# Patient Record
Sex: MALE | Race: WHITE | Employment: OTHER | URBAN - METROPOLITAN AREA
[De-identification: names, ages, dates, MRNs, and addresses within clinical notes are randomized per-mention and may not be internally consistent; named-entity substitution may affect disease eponyms.]

---

## 2019-02-06 ENCOUNTER — APPOINTMENT (OUTPATIENT)
Dept: ULTRASOUND IMAGING | Age: 49
End: 2019-02-06
Payer: COMMERCIAL

## 2019-02-06 ENCOUNTER — APPOINTMENT (OUTPATIENT)
Dept: CT IMAGING | Age: 49
End: 2019-02-06
Payer: COMMERCIAL

## 2019-02-06 ENCOUNTER — HOSPITAL ENCOUNTER (EMERGENCY)
Age: 49
Discharge: HOME | End: 2019-02-06
Attending: EMERGENCY MEDICINE
Payer: COMMERCIAL

## 2019-02-06 VITALS
OXYGEN SATURATION: 94 % | BODY MASS INDEX: 37.94 KG/M2 | HEIGHT: 70 IN | WEIGHT: 265 LBS | SYSTOLIC BLOOD PRESSURE: 160 MMHG | HEART RATE: 91 BPM | DIASTOLIC BLOOD PRESSURE: 87 MMHG | TEMPERATURE: 98.3 F | RESPIRATION RATE: 16 BRPM

## 2019-02-06 DIAGNOSIS — D72.829 LEUKOCYTOSIS, UNSPECIFIED TYPE: ICD-10-CM

## 2019-02-06 DIAGNOSIS — R10.13 ABDOMINAL PAIN, EPIGASTRIC: Primary | ICD-10-CM

## 2019-02-06 DIAGNOSIS — K86.9 LESION OF PANCREAS: ICD-10-CM

## 2019-02-06 DIAGNOSIS — E78.1 HYPERTRIGLYCERIDEMIA: ICD-10-CM

## 2019-02-06 DIAGNOSIS — R10.11 RIGHT UPPER QUADRANT ABDOMINAL PAIN: ICD-10-CM

## 2019-02-06 LAB
ALBUMIN SERPL-MCNC: 3.9 G/DL (ref 3.5–5.2)
ALP BLD-CCNC: 125 U/L (ref 40–129)
ALT SERPL-CCNC: 22 U/L (ref 0–40)
ANION GAP SERPL CALCULATED.3IONS-SCNC: 13 MMOL/L (ref 7–16)
AST SERPL-CCNC: 24 U/L (ref 0–39)
BACTERIA: ABNORMAL /HPF
BASOPHILS ABSOLUTE: 0.1 E9/L (ref 0–0.2)
BASOPHILS RELATIVE PERCENT: 0.6 % (ref 0–2)
BETA-HYDROXYBUTYRATE: 0.23 MMOL/L (ref 0.02–0.27)
BILIRUB SERPL-MCNC: 0.2 MG/DL (ref 0–1.2)
BILIRUBIN DIRECT: <0.2 MG/DL (ref 0–0.3)
BILIRUBIN URINE: NEGATIVE
BILIRUBIN, INDIRECT: NORMAL MG/DL (ref 0–1)
BLOOD, URINE: ABNORMAL
BUN BLDV-MCNC: 19 MG/DL (ref 6–20)
CALCIUM SERPL-MCNC: 10.7 MG/DL (ref 8.6–10.2)
CHLORIDE BLD-SCNC: 102 MMOL/L (ref 98–107)
CLARITY: CLEAR
CO2: 24 MMOL/L (ref 22–29)
COLOR: YELLOW
CREAT SERPL-MCNC: 1 MG/DL (ref 0.7–1.2)
EOSINOPHILS ABSOLUTE: 0.28 E9/L (ref 0.05–0.5)
EOSINOPHILS RELATIVE PERCENT: 1.6 % (ref 0–6)
ETHANOL: <10 MG/DL (ref 0–0.08)
GFR AFRICAN AMERICAN: >60
GFR NON-AFRICAN AMERICAN: >60 ML/MIN/1.73
GLUCOSE BLD-MCNC: 152 MG/DL (ref 74–99)
GLUCOSE URINE: >=1000 MG/DL
HCT VFR BLD CALC: 42.6 % (ref 37–54)
HEMOGLOBIN: 13.9 G/DL (ref 12.5–16.5)
IMMATURE GRANULOCYTES #: 0.18 E9/L
IMMATURE GRANULOCYTES %: 1 % (ref 0–5)
INR BLD: 1
KETONES, URINE: NEGATIVE MG/DL
LACTIC ACID: 3 MMOL/L (ref 0.5–2.2)
LEUKOCYTE ESTERASE, URINE: NEGATIVE
LIPASE: 14 U/L (ref 13–60)
LYMPHOCYTES ABSOLUTE: 1.77 E9/L (ref 1.5–4)
LYMPHOCYTES RELATIVE PERCENT: 9.9 % (ref 20–42)
MCH RBC QN AUTO: 26.2 PG (ref 26–35)
MCHC RBC AUTO-ENTMCNC: 32.6 % (ref 32–34.5)
MCV RBC AUTO: 80.4 FL (ref 80–99.9)
MONOCYTES ABSOLUTE: 1.02 E9/L (ref 0.1–0.95)
MONOCYTES RELATIVE PERCENT: 5.7 % (ref 2–12)
NEUTROPHILS ABSOLUTE: 14.6 E9/L (ref 1.8–7.3)
NEUTROPHILS RELATIVE PERCENT: 81.2 % (ref 43–80)
NITRITE, URINE: NEGATIVE
PDW BLD-RTO: 14.7 FL (ref 11.5–15)
PH UA: 5.5 (ref 5–9)
PH VENOUS: 7.49 (ref 7.3–7.42)
PLATELET # BLD: 298 E9/L (ref 130–450)
PMV BLD AUTO: 10.2 FL (ref 7–12)
POTASSIUM SERPL-SCNC: 4.6 MMOL/L (ref 3.5–5)
PROTEIN UA: 30 MG/DL
PROTHROMBIN TIME: 10.7 SEC (ref 9.3–12.4)
RBC # BLD: 5.3 E12/L (ref 3.8–5.8)
RBC UA: ABNORMAL /HPF (ref 0–2)
SODIUM BLD-SCNC: 139 MMOL/L (ref 132–146)
SPECIFIC GRAVITY UA: 1.02 (ref 1–1.03)
TOTAL PROTEIN: 7.2 G/DL (ref 6.4–8.3)
TRIGL SERPL-MCNC: 229 MG/DL (ref 0–149)
TROPONIN: <0.01 NG/ML (ref 0–0.03)
UROBILINOGEN, URINE: 0.2 E.U./DL
WBC # BLD: 18 E9/L (ref 4.5–11.5)
WBC UA: ABNORMAL /HPF (ref 0–5)

## 2019-02-06 PROCEDURE — 85025 COMPLETE CBC W/AUTO DIFF WBC: CPT

## 2019-02-06 PROCEDURE — 85610 PROTHROMBIN TIME: CPT

## 2019-02-06 PROCEDURE — 74176 CT ABD & PELVIS W/O CONTRAST: CPT

## 2019-02-06 PROCEDURE — 84478 ASSAY OF TRIGLYCERIDES: CPT

## 2019-02-06 PROCEDURE — 36415 COLL VENOUS BLD VENIPUNCTURE: CPT

## 2019-02-06 PROCEDURE — 82800 BLOOD PH: CPT

## 2019-02-06 PROCEDURE — 96374 THER/PROPH/DIAG INJ IV PUSH: CPT

## 2019-02-06 PROCEDURE — 6360000002 HC RX W HCPCS: Performed by: EMERGENCY MEDICINE

## 2019-02-06 PROCEDURE — 83605 ASSAY OF LACTIC ACID: CPT

## 2019-02-06 PROCEDURE — 80048 BASIC METABOLIC PNL TOTAL CA: CPT

## 2019-02-06 PROCEDURE — 82010 KETONE BODYS QUAN: CPT

## 2019-02-06 PROCEDURE — 80076 HEPATIC FUNCTION PANEL: CPT

## 2019-02-06 PROCEDURE — 76705 ECHO EXAM OF ABDOMEN: CPT

## 2019-02-06 PROCEDURE — G0480 DRUG TEST DEF 1-7 CLASSES: HCPCS

## 2019-02-06 PROCEDURE — C9113 INJ PANTOPRAZOLE SODIUM, VIA: HCPCS | Performed by: EMERGENCY MEDICINE

## 2019-02-06 PROCEDURE — 84484 ASSAY OF TROPONIN QUANT: CPT

## 2019-02-06 PROCEDURE — 83690 ASSAY OF LIPASE: CPT

## 2019-02-06 PROCEDURE — 99284 EMERGENCY DEPT VISIT MOD MDM: CPT

## 2019-02-06 PROCEDURE — 2580000003 HC RX 258: Performed by: EMERGENCY MEDICINE

## 2019-02-06 PROCEDURE — 96375 TX/PRO/DX INJ NEW DRUG ADDON: CPT

## 2019-02-06 PROCEDURE — 81001 URINALYSIS AUTO W/SCOPE: CPT

## 2019-02-06 PROCEDURE — 93005 ELECTROCARDIOGRAM TRACING: CPT | Performed by: EMERGENCY MEDICINE

## 2019-02-06 RX ORDER — ONDANSETRON 2 MG/ML
4 INJECTION INTRAMUSCULAR; INTRAVENOUS ONCE
Status: COMPLETED | OUTPATIENT
Start: 2019-02-06 | End: 2019-02-06

## 2019-02-06 RX ORDER — PANTOPRAZOLE SODIUM 40 MG/10ML
40 INJECTION, POWDER, LYOPHILIZED, FOR SOLUTION INTRAVENOUS ONCE
Status: COMPLETED | OUTPATIENT
Start: 2019-02-06 | End: 2019-02-06

## 2019-02-06 RX ORDER — MORPHINE SULFATE 2 MG/ML
10 INJECTION, SOLUTION INTRAMUSCULAR; INTRAVENOUS ONCE
Status: DISCONTINUED | OUTPATIENT
Start: 2019-02-06 | End: 2019-02-06

## 2019-02-06 RX ORDER — HYDROCODONE BITARTRATE AND ACETAMINOPHEN 5; 325 MG/1; MG/1
1 TABLET ORAL EVERY 6 HOURS PRN
Qty: 12 TABLET | Refills: 0 | Status: SHIPPED | OUTPATIENT
Start: 2019-02-06 | End: 2019-02-09

## 2019-02-06 RX ORDER — SODIUM CHLORIDE 0.9 % (FLUSH) 0.9 %
10 SYRINGE (ML) INJECTION PRN
Status: DISCONTINUED | OUTPATIENT
Start: 2019-02-06 | End: 2019-02-06 | Stop reason: HOSPADM

## 2019-02-06 RX ORDER — 0.9 % SODIUM CHLORIDE 0.9 %
1000 INTRAVENOUS SOLUTION INTRAVENOUS ONCE
Status: COMPLETED | OUTPATIENT
Start: 2019-02-06 | End: 2019-02-06

## 2019-02-06 RX ADMIN — HYDROMORPHONE HYDROCHLORIDE 1 MG: 1 INJECTION, SOLUTION INTRAMUSCULAR; INTRAVENOUS; SUBCUTANEOUS at 14:10

## 2019-02-06 RX ADMIN — PANTOPRAZOLE SODIUM 40 MG: 40 INJECTION, POWDER, FOR SOLUTION INTRAVENOUS at 14:13

## 2019-02-06 RX ADMIN — ONDANSETRON 4 MG: 2 INJECTION INTRAMUSCULAR; INTRAVENOUS at 14:09

## 2019-02-06 RX ADMIN — SODIUM CHLORIDE 1000 ML: 9 INJECTION, SOLUTION INTRAVENOUS at 15:30

## 2019-02-06 RX ADMIN — SODIUM CHLORIDE 1000 ML: 9 INJECTION, SOLUTION INTRAVENOUS at 14:09

## 2019-02-06 ASSESSMENT — PAIN DESCRIPTION - PAIN TYPE
TYPE: ACUTE PAIN

## 2019-02-06 ASSESSMENT — ENCOUNTER SYMPTOMS
EYE PAIN: 0
CHEST TIGHTNESS: 0
VOMITING: 0
COLOR CHANGE: 0
SHORTNESS OF BREATH: 0
NAUSEA: 1
BACK PAIN: 0
SORE THROAT: 0
ABDOMINAL PAIN: 1
RHINORRHEA: 0
BLOOD IN STOOL: 0
DIARRHEA: 0
COUGH: 0

## 2019-02-06 ASSESSMENT — PAIN DESCRIPTION - ORIENTATION
ORIENTATION: RIGHT;UPPER

## 2019-02-06 ASSESSMENT — PAIN DESCRIPTION - FREQUENCY
FREQUENCY: CONTINUOUS

## 2019-02-06 ASSESSMENT — PAIN DESCRIPTION - PROGRESSION
CLINICAL_PROGRESSION: GRADUALLY IMPROVING
CLINICAL_PROGRESSION: NOT CHANGED

## 2019-02-06 ASSESSMENT — PAIN SCALES - GENERAL
PAINLEVEL_OUTOF10: 7
PAINLEVEL_OUTOF10: 8
PAINLEVEL_OUTOF10: 9
PAINLEVEL_OUTOF10: 8
PAINLEVEL_OUTOF10: 6

## 2019-02-06 ASSESSMENT — PAIN DESCRIPTION - DESCRIPTORS
DESCRIPTORS: STABBING;SHARP
DESCRIPTORS: STABBING;SHARP
DESCRIPTORS: SHARP;BURNING
DESCRIPTORS: STABBING;SHARP

## 2019-02-06 ASSESSMENT — PAIN DESCRIPTION - LOCATION
LOCATION: ABDOMEN

## 2019-02-06 ASSESSMENT — PAIN DESCRIPTION - ONSET: ONSET: ON-GOING

## 2019-02-08 LAB
EKG ATRIAL RATE: 104 BPM
EKG P AXIS: 12 DEGREES
EKG P-R INTERVAL: 152 MS
EKG Q-T INTERVAL: 326 MS
EKG QRS DURATION: 90 MS
EKG QTC CALCULATION (BAZETT): 428 MS
EKG R AXIS: 22 DEGREES
EKG T AXIS: 44 DEGREES
EKG VENTRICULAR RATE: 104 BPM

## 2021-03-23 ENCOUNTER — IMMUNIZATION (OUTPATIENT)
Dept: PRIMARY CARE CLINIC | Age: 51
End: 2021-03-23
Payer: COMMERCIAL

## 2021-03-23 PROCEDURE — 91300 COVID-19, PFIZER VACCINE 30MCG/0.3ML DOSE: CPT | Performed by: INTERNAL MEDICINE

## 2021-03-23 PROCEDURE — 0001A COVID-19, PFIZER VACCINE 30MCG/0.3ML DOSE: CPT | Performed by: INTERNAL MEDICINE

## 2021-04-20 ENCOUNTER — IMMUNIZATION (OUTPATIENT)
Dept: PRIMARY CARE CLINIC | Age: 51
End: 2021-04-20
Payer: COMMERCIAL

## 2021-04-20 PROCEDURE — 0002A COVID-19, PFIZER VACCINE 30MCG/0.3ML DOSE: CPT | Performed by: INTERNAL MEDICINE

## 2021-04-20 PROCEDURE — 91300 COVID-19, PFIZER VACCINE 30MCG/0.3ML DOSE: CPT | Performed by: INTERNAL MEDICINE

## 2023-05-15 PROBLEM — I10 PRIMARY HYPERTENSION: Status: ACTIVE | Noted: 2023-05-15

## 2023-05-15 PROBLEM — F31.9 BIPOLAR DEPRESSION (MULTI): Status: ACTIVE | Noted: 2023-05-15

## 2023-05-15 PROBLEM — K21.9 GASTROESOPHAGEAL REFLUX DISEASE WITHOUT ESOPHAGITIS: Status: ACTIVE | Noted: 2023-05-15

## 2023-05-15 PROBLEM — K86.3: Status: ACTIVE | Noted: 2023-05-15

## 2023-05-15 PROBLEM — K86.1: Status: ACTIVE | Noted: 2023-05-15

## 2023-05-15 PROBLEM — E10.42 TYPE 1 DIABETES MELLITUS WITH DIABETIC POLYNEUROPATHY (MULTI): Status: ACTIVE | Noted: 2023-05-15

## 2023-05-15 PROBLEM — E66.01 MORBID OBESITY (MULTI): Status: ACTIVE | Noted: 2023-05-15

## 2023-05-15 PROBLEM — F10.21 RECOVERING ALCOHOLIC (MULTI): Status: ACTIVE | Noted: 2023-05-15

## 2023-05-15 NOTE — PROGRESS NOTES
Subjective   Patient ID: Raul Cash is a 53 y.o. male who presents for Med Refill (Basuglar, Novolin and Metoprolol - 6 month refill).    He is here today to follow-up on type 1 diabetes, bipolar depression pseudocyst of the pancreas, GERD, hypertension and morbid obesity.  He works with a psychiatrist who stopped his Seroquel and his weight has been falling off.  In general he feels great other than a little dull pancreatic ache that is chronic.    His blood pressure is too high and we discussed adding amlodipine to the metoprolol    He also is complaining of some erectile dysfunction.         Review of Systems   Cardiovascular:         See HPI   Gastrointestinal:         See HPI   Genitourinary:         As mentioned in the HPI   All other systems reviewed and are negative.      Objective   BP (!) 190/116 (BP Location: Left arm)   Wt 112 kg (246 lb 12.8 oz)   BMI 36.71 kg/m²     Physical Exam  Constitutional:       Appearance: Normal appearance.   HENT:      Head: Normocephalic and atraumatic.   Cardiovascular:      Rate and Rhythm: Normal rate and regular rhythm.   Pulmonary:      Effort: Pulmonary effort is normal.      Breath sounds: Normal breath sounds.   Musculoskeletal:      Cervical back: Normal range of motion and neck supple.   Neurological:      General: No focal deficit present.      Mental Status: He is alert and oriented to person, place, and time.   Psychiatric:         Mood and Affect: Mood normal.         Behavior: Behavior normal.         Thought Content: Thought content normal.         Judgment: Judgment normal.         Assessment/Plan   Problem List Items Addressed This Visit       Type 1 diabetes mellitus with diabetic polyneuropathy (CMS/HCC) - Primary     Working with endocrinology         Relevant Medications    Basaglar KwikPen U-100 Insulin 100 unit/mL (3 mL) pen    NovoLIN N FlexPen 100 unit/mL (3 mL) injection    Other Relevant Orders    CBC    Comprehensive Metabolic Panel     Hemoglobin A1C    Lipid Panel    Bipolar depression (CMS/HCC)     This has been stable         Recovering alcoholic (CMS/[facility])     He is in remission         Pseudocyst of pancreas due to chronic pancreatitis (CMS/[facility])     Followed by GI         Gastroesophageal reflux disease without esophagitis    Primary hypertension    Relevant Medications    metoprolol tartrate (Lopressor) 100 mg tablet    amLODIPine (Norvasc) 5 mg tablet    Morbid obesity (CMS/[facility])     Working with endocrinology          Other Visit Diagnoses       Vasculogenic erectile dysfunction, unspecified vasculogenic erectile dysfunction type        Relevant Medications    sildenafil (Viagra) 100 mg tablet    Other Relevant Orders    Prostate Specific Antigen

## 2023-05-16 ENCOUNTER — OFFICE VISIT (OUTPATIENT)
Dept: PRIMARY CARE | Facility: CLINIC | Age: 53
End: 2023-05-16
Payer: MEDICARE

## 2023-05-16 ENCOUNTER — LAB (OUTPATIENT)
Dept: LAB | Facility: LAB | Age: 53
End: 2023-05-16
Payer: MEDICARE

## 2023-05-16 VITALS
DIASTOLIC BLOOD PRESSURE: 116 MMHG | SYSTOLIC BLOOD PRESSURE: 190 MMHG | WEIGHT: 246.8 LBS | BODY MASS INDEX: 36.71 KG/M2

## 2023-05-16 DIAGNOSIS — E10.42 TYPE 1 DIABETES MELLITUS WITH DIABETIC POLYNEUROPATHY (MULTI): ICD-10-CM

## 2023-05-16 DIAGNOSIS — N52.9 VASCULOGENIC ERECTILE DYSFUNCTION, UNSPECIFIED VASCULOGENIC ERECTILE DYSFUNCTION TYPE: ICD-10-CM

## 2023-05-16 DIAGNOSIS — F10.21 RECOVERING ALCOHOLIC (MULTI): ICD-10-CM

## 2023-05-16 DIAGNOSIS — K86.3: ICD-10-CM

## 2023-05-16 DIAGNOSIS — E66.01 MORBID OBESITY (MULTI): ICD-10-CM

## 2023-05-16 DIAGNOSIS — F31.9 BIPOLAR DEPRESSION (MULTI): ICD-10-CM

## 2023-05-16 DIAGNOSIS — K86.1: ICD-10-CM

## 2023-05-16 DIAGNOSIS — I10 PRIMARY HYPERTENSION: ICD-10-CM

## 2023-05-16 DIAGNOSIS — K21.9 GASTROESOPHAGEAL REFLUX DISEASE WITHOUT ESOPHAGITIS: ICD-10-CM

## 2023-05-16 DIAGNOSIS — E10.42 TYPE 1 DIABETES MELLITUS WITH DIABETIC POLYNEUROPATHY (MULTI): Primary | ICD-10-CM

## 2023-05-16 LAB
ALANINE AMINOTRANSFERASE (SGPT) (U/L) IN SER/PLAS: 12 U/L (ref 10–52)
ALBUMIN (G/DL) IN SER/PLAS: 4.4 G/DL (ref 3.4–5)
ALKALINE PHOSPHATASE (U/L) IN SER/PLAS: 109 U/L (ref 33–120)
ANION GAP IN SER/PLAS: 16 MMOL/L (ref 10–20)
ASPARTATE AMINOTRANSFERASE (SGOT) (U/L) IN SER/PLAS: 13 U/L (ref 9–39)
BILIRUBIN TOTAL (MG/DL) IN SER/PLAS: 0.7 MG/DL (ref 0–1.2)
CALCIUM (MG/DL) IN SER/PLAS: 9.9 MG/DL (ref 8.6–10.3)
CARBON DIOXIDE, TOTAL (MMOL/L) IN SER/PLAS: 24 MMOL/L (ref 21–32)
CHLORIDE (MMOL/L) IN SER/PLAS: 101 MMOL/L (ref 98–107)
CHOLESTEROL (MG/DL) IN SER/PLAS: 201 MG/DL (ref 0–199)
CHOLESTEROL IN HDL (MG/DL) IN SER/PLAS: 50.6 MG/DL
CHOLESTEROL/HDL RATIO: 4
CREATININE (MG/DL) IN SER/PLAS: 1.14 MG/DL (ref 0.5–1.3)
ERYTHROCYTE DISTRIBUTION WIDTH (RATIO) BY AUTOMATED COUNT: 13.8 % (ref 11.5–14.5)
ERYTHROCYTE MEAN CORPUSCULAR HEMOGLOBIN CONCENTRATION (G/DL) BY AUTOMATED: 33.5 G/DL (ref 32–36)
ERYTHROCYTE MEAN CORPUSCULAR VOLUME (FL) BY AUTOMATED COUNT: 81 FL (ref 80–100)
ERYTHROCYTES (10*6/UL) IN BLOOD BY AUTOMATED COUNT: 6.33 X10E12/L (ref 4.5–5.9)
ESTIMATED AVERAGE GLUCOSE FOR HBA1C: 269 MG/DL
GFR MALE: 77 ML/MIN/1.73M2
GLUCOSE (MG/DL) IN SER/PLAS: 230 MG/DL (ref 74–99)
HEMATOCRIT (%) IN BLOOD BY AUTOMATED COUNT: 51 % (ref 41–52)
HEMOGLOBIN (G/DL) IN BLOOD: 17.1 G/DL (ref 13.5–17.5)
HEMOGLOBIN A1C/HEMOGLOBIN TOTAL IN BLOOD: 11 %
LDL: 95 MG/DL (ref 0–99)
LEUKOCYTES (10*3/UL) IN BLOOD BY AUTOMATED COUNT: 11.3 X10E9/L (ref 4.4–11.3)
NON HDL CHOLESTEROL: 150 MG/DL
PLATELETS (10*3/UL) IN BLOOD AUTOMATED COUNT: 347 X10E9/L (ref 150–450)
POTASSIUM (MMOL/L) IN SER/PLAS: 4.7 MMOL/L (ref 3.5–5.3)
PROSTATE SPECIFIC AG (NG/ML) IN SER/PLAS: 2.93 NG/ML (ref 0–4)
PROTEIN TOTAL: 7.2 G/DL (ref 6.4–8.2)
SODIUM (MMOL/L) IN SER/PLAS: 136 MMOL/L (ref 136–145)
TRIGLYCERIDE (MG/DL) IN SER/PLAS: 275 MG/DL (ref 0–149)
UREA NITROGEN (MG/DL) IN SER/PLAS: 27 MG/DL (ref 6–23)
VLDL: 55 MG/DL (ref 0–40)

## 2023-05-16 PROCEDURE — 3080F DIAST BP >= 90 MM HG: CPT | Performed by: FAMILY MEDICINE

## 2023-05-16 PROCEDURE — 3077F SYST BP >= 140 MM HG: CPT | Performed by: FAMILY MEDICINE

## 2023-05-16 PROCEDURE — 85027 COMPLETE CBC AUTOMATED: CPT

## 2023-05-16 PROCEDURE — 80061 LIPID PANEL: CPT

## 2023-05-16 PROCEDURE — 99214 OFFICE O/P EST MOD 30 MIN: CPT | Performed by: FAMILY MEDICINE

## 2023-05-16 PROCEDURE — 83036 HEMOGLOBIN GLYCOSYLATED A1C: CPT

## 2023-05-16 PROCEDURE — 36415 COLL VENOUS BLD VENIPUNCTURE: CPT

## 2023-05-16 PROCEDURE — 84153 ASSAY OF PSA TOTAL: CPT

## 2023-05-16 PROCEDURE — 80053 COMPREHEN METABOLIC PANEL: CPT

## 2023-05-16 RX ORDER — DEXTROAMPHETAMINE SACCHARATE, AMPHETAMINE ASPARTATE, DEXTROAMPHETAMINE SULFATE AND AMPHETAMINE SULFATE 7.5; 7.5; 7.5; 7.5 MG/1; MG/1; MG/1; MG/1
TABLET ORAL
COMMUNITY
Start: 2023-04-26

## 2023-05-16 RX ORDER — AMLODIPINE BESYLATE 5 MG/1
5 TABLET ORAL DAILY
Qty: 30 TABLET | Refills: 5 | Status: SHIPPED | OUTPATIENT
Start: 2023-05-16 | End: 2023-08-16 | Stop reason: SDUPTHER

## 2023-05-16 RX ORDER — HUMAN INSULIN 100 [IU]/ML
18 INJECTION, SUSPENSION SUBCUTANEOUS NIGHTLY
Qty: 16.2 ML | Refills: 3 | Status: SHIPPED | OUTPATIENT
Start: 2023-05-16 | End: 2023-08-16 | Stop reason: SDUPTHER

## 2023-05-16 RX ORDER — ALPRAZOLAM 1 MG/1
1 TABLET ORAL
COMMUNITY

## 2023-05-16 RX ORDER — INSULIN GLARGINE 100 [IU]/ML
100 INJECTION, SOLUTION SUBCUTANEOUS EVERY MORNING
Qty: 90 ML | Refills: 0 | Status: SHIPPED | OUTPATIENT
Start: 2023-05-16 | End: 2023-08-16 | Stop reason: SDUPTHER

## 2023-05-16 RX ORDER — HUMAN INSULIN 100 [IU]/ML
INJECTION, SUSPENSION SUBCUTANEOUS
COMMUNITY
Start: 2023-04-26 | End: 2023-05-16 | Stop reason: SDUPTHER

## 2023-05-16 RX ORDER — ZOLPIDEM TARTRATE 10 MG/1
1 TABLET ORAL NIGHTLY PRN
COMMUNITY
Start: 2020-06-15

## 2023-05-16 RX ORDER — DIVALPROEX SODIUM 250 MG/1
1 TABLET, FILM COATED, EXTENDED RELEASE ORAL DAILY
COMMUNITY
Start: 2021-06-25

## 2023-05-16 RX ORDER — SILDENAFIL 100 MG/1
100 TABLET, FILM COATED ORAL DAILY PRN
Qty: 12 TABLET | Refills: 3 | Status: SHIPPED | OUTPATIENT
Start: 2023-05-16 | End: 2024-05-15

## 2023-05-16 RX ORDER — METOPROLOL TARTRATE 100 MG/1
1 TABLET ORAL 2 TIMES DAILY
COMMUNITY
Start: 2015-06-03 | End: 2023-05-16 | Stop reason: SDUPTHER

## 2023-05-16 RX ORDER — RISPERIDONE 2 MG/1
TABLET ORAL
COMMUNITY
Start: 2023-04-26

## 2023-05-16 RX ORDER — INSULIN GLARGINE 100 [IU]/ML
INJECTION, SOLUTION SUBCUTANEOUS
COMMUNITY
Start: 2018-04-24 | End: 2023-05-16 | Stop reason: SDUPTHER

## 2023-05-16 RX ORDER — METOPROLOL TARTRATE 100 MG/1
100 TABLET ORAL 2 TIMES DAILY
Qty: 180 TABLET | Refills: 3 | Status: SHIPPED | OUTPATIENT
Start: 2023-05-16 | End: 2023-08-16 | Stop reason: SDUPTHER

## 2023-05-16 RX ORDER — BLOOD SUGAR DIAGNOSTIC
STRIP MISCELLANEOUS 4 TIMES DAILY
COMMUNITY
Start: 2019-03-07

## 2023-05-16 RX ORDER — BLOOD SUGAR DIAGNOSTIC
STRIP MISCELLANEOUS 4 TIMES DAILY
COMMUNITY
Start: 2014-09-09

## 2023-05-16 ASSESSMENT — ENCOUNTER SYMPTOMS: ROS GI COMMENTS: SEE HPI

## 2023-05-16 ASSESSMENT — PATIENT HEALTH QUESTIONNAIRE - PHQ9: 2. FEELING DOWN, DEPRESSED OR HOPELESS: NOT AT ALL

## 2023-05-16 NOTE — PATIENT INSTRUCTIONS
I sent in a prescription for Viagra as well as amlodipine for you and I will follow-up with you in 3 months.  Today we will update your major chemistry.

## 2023-08-16 ENCOUNTER — TELEPHONE (OUTPATIENT)
Dept: PRIMARY CARE | Facility: CLINIC | Age: 53
End: 2023-08-16

## 2023-08-16 ENCOUNTER — LAB (OUTPATIENT)
Dept: LAB | Facility: LAB | Age: 53
End: 2023-08-16
Payer: MEDICARE

## 2023-08-16 ENCOUNTER — OFFICE VISIT (OUTPATIENT)
Dept: PRIMARY CARE | Facility: CLINIC | Age: 53
End: 2023-08-16
Payer: MEDICARE

## 2023-08-16 VITALS
DIASTOLIC BLOOD PRESSURE: 100 MMHG | HEART RATE: 71 BPM | WEIGHT: 248 LBS | BODY MASS INDEX: 36.89 KG/M2 | OXYGEN SATURATION: 99 % | TEMPERATURE: 97.4 F | SYSTOLIC BLOOD PRESSURE: 160 MMHG

## 2023-08-16 DIAGNOSIS — E10.42 TYPE 1 DIABETES MELLITUS WITH DIABETIC POLYNEUROPATHY (MULTI): ICD-10-CM

## 2023-08-16 DIAGNOSIS — I10 PRIMARY HYPERTENSION: Primary | ICD-10-CM

## 2023-08-16 LAB
ALANINE AMINOTRANSFERASE (SGPT) (U/L) IN SER/PLAS: 14 U/L (ref 10–52)
ALBUMIN (G/DL) IN SER/PLAS: 4.2 G/DL (ref 3.4–5)
ALKALINE PHOSPHATASE (U/L) IN SER/PLAS: 108 U/L (ref 33–120)
ANION GAP IN SER/PLAS: 15 MMOL/L (ref 10–20)
ASPARTATE AMINOTRANSFERASE (SGOT) (U/L) IN SER/PLAS: 14 U/L (ref 9–39)
BILIRUBIN TOTAL (MG/DL) IN SER/PLAS: 0.7 MG/DL (ref 0–1.2)
CALCIUM (MG/DL) IN SER/PLAS: 9.5 MG/DL (ref 8.6–10.3)
CARBON DIOXIDE, TOTAL (MMOL/L) IN SER/PLAS: 21 MMOL/L (ref 21–32)
CHLORIDE (MMOL/L) IN SER/PLAS: 103 MMOL/L (ref 98–107)
CREATININE (MG/DL) IN SER/PLAS: 1.08 MG/DL (ref 0.5–1.3)
ESTIMATED AVERAGE GLUCOSE FOR HBA1C: 286 MG/DL
GFR MALE: 82 ML/MIN/1.73M2
GLUCOSE (MG/DL) IN SER/PLAS: 306 MG/DL (ref 74–99)
HEMOGLOBIN A1C/HEMOGLOBIN TOTAL IN BLOOD: 11.6 %
POTASSIUM (MMOL/L) IN SER/PLAS: 4.7 MMOL/L (ref 3.5–5.3)
PROTEIN TOTAL: 6.9 G/DL (ref 6.4–8.2)
SODIUM (MMOL/L) IN SER/PLAS: 134 MMOL/L (ref 136–145)
UREA NITROGEN (MG/DL) IN SER/PLAS: 25 MG/DL (ref 6–23)

## 2023-08-16 PROCEDURE — 80053 COMPREHEN METABOLIC PANEL: CPT

## 2023-08-16 PROCEDURE — 36415 COLL VENOUS BLD VENIPUNCTURE: CPT

## 2023-08-16 PROCEDURE — 99214 OFFICE O/P EST MOD 30 MIN: CPT | Performed by: FAMILY MEDICINE

## 2023-08-16 PROCEDURE — 3077F SYST BP >= 140 MM HG: CPT | Performed by: FAMILY MEDICINE

## 2023-08-16 PROCEDURE — 1036F TOBACCO NON-USER: CPT | Performed by: FAMILY MEDICINE

## 2023-08-16 PROCEDURE — 3046F HEMOGLOBIN A1C LEVEL >9.0%: CPT | Performed by: FAMILY MEDICINE

## 2023-08-16 PROCEDURE — 83036 HEMOGLOBIN GLYCOSYLATED A1C: CPT

## 2023-08-16 PROCEDURE — 3080F DIAST BP >= 90 MM HG: CPT | Performed by: FAMILY MEDICINE

## 2023-08-16 RX ORDER — METOPROLOL TARTRATE 100 MG/1
100 TABLET ORAL 2 TIMES DAILY
Qty: 60 TABLET | Refills: 11 | Status: SHIPPED | OUTPATIENT
Start: 2023-08-16 | End: 2024-08-15

## 2023-08-16 RX ORDER — HUMAN INSULIN 100 [IU]/ML
18 INJECTION, SUSPENSION SUBCUTANEOUS NIGHTLY
Qty: 16.2 ML | Refills: 3 | Status: SHIPPED | OUTPATIENT
Start: 2023-08-16 | End: 2023-11-14

## 2023-08-16 RX ORDER — AMLODIPINE BESYLATE 5 MG/1
5 TABLET ORAL DAILY
Qty: 30 TABLET | Refills: 5 | Status: SHIPPED | OUTPATIENT
Start: 2023-08-16 | End: 2024-02-12

## 2023-08-16 RX ORDER — INSULIN GLARGINE 100 [IU]/ML
100 INJECTION, SOLUTION SUBCUTANEOUS EVERY MORNING
Qty: 90 ML | Refills: 0 | Status: SHIPPED | OUTPATIENT
Start: 2023-08-16 | End: 2024-02-14 | Stop reason: SDUPTHER

## 2023-08-16 ASSESSMENT — ENCOUNTER SYMPTOMS: HYPERTENSION: 1

## 2023-08-16 NOTE — TELEPHONE ENCOUNTER
Patient advised with understanding.  He does not check his blood sugars at home because he can not afford the test strips.  He is afraid he may not be able to afford to see an Endocrinologist, as well.  Patient is on [place].  He will call his insurance company and find out who takes his insurance near him and let us know so we can send a referral.

## 2023-08-16 NOTE — PROGRESS NOTES
Subjective   Patient ID: Raul Cash is a 53 y.o. male who presents for Hypertension.    He is here today principally to follow-up on his elevated blood pressure.  The amlodipine is well-tolerated and is readings today was better than the last time here.  His home blood pressure readings are in the high normal level.  We are going to recheck his A1c and fasting blood sugar today.  He is being followed by psychiatry for bipolar depression and he is doing well as far as staying away from alcohol.  The pseudocyst of the pancreas has been stable.    Hypertension             Objective   BP (!) 160/100 (BP Location: Left arm, Patient Position: Sitting, BP Cuff Size: Large adult)   Pulse 71   Temp 36.3 °C (97.4 °F) (Skin)   Wt 112 kg (248 lb)   SpO2 99%   BMI 36.89 kg/m²     Physical Exam  Constitutional:       Appearance: Normal appearance.   HENT:      Head: Normocephalic and atraumatic.      Nose: Nose normal.   Cardiovascular:      Rate and Rhythm: Normal rate and regular rhythm.   Pulmonary:      Effort: Pulmonary effort is normal.      Breath sounds: Normal breath sounds.   Musculoskeletal:      Cervical back: Normal range of motion and neck supple.   Neurological:      General: No focal deficit present.      Mental Status: He is alert and oriented to person, place, and time.   Psychiatric:         Mood and Affect: Mood normal.         Behavior: Behavior normal.         Thought Content: Thought content normal.         Judgment: Judgment normal.         Assessment/Plan   Problem List Items Addressed This Visit       Type 1 diabetes mellitus with diabetic polyneuropathy (CMS/HCC)    Relevant Medications    Basaglar KwikPen U-100 Insulin 100 unit/mL (3 mL) pen    NovoLIN N FlexPen 100 unit/mL (3 mL) injection    Other Relevant Orders    Comprehensive Metabolic Panel    Hemoglobin A1C    Primary hypertension - Primary    Relevant Medications    amLODIPine (Norvasc) 5 mg tablet    metoprolol tartrate (Lopressor) 100  mg tablet     I will recheck numbers as a relates to his diabetes and he will follow-up with Dr. Dash in 3 to 4 months.  He will ask her around the December to sign his continuous disability papers.  We have those forms already in the record and things are stable and unchanged.

## 2023-08-16 NOTE — PATIENT INSTRUCTIONS
Let me know over the next few weeks what kind of blood pressure readings you are getting and I will be in touch with you as your lab work becomes available.  Is been my pleasure being your physician over all these years.  Regards, MONET

## 2023-08-16 NOTE — TELEPHONE ENCOUNTER
----- Message from Darian Coker MD sent at 8/16/2023  3:33 PM EDT -----  Sugar is still running too high.  See if there is an endocrinologist that he can work with near where he lives.

## 2023-08-17 ENCOUNTER — TELEPHONE (OUTPATIENT)
Dept: PRIMARY CARE | Facility: CLINIC | Age: 53
End: 2023-08-17
Payer: MEDICARE

## 2023-08-17 DIAGNOSIS — E10.42 TYPE 1 DIABETES MELLITUS WITH DIABETIC POLYNEUROPATHY (MULTI): Primary | ICD-10-CM

## 2023-08-17 DIAGNOSIS — E10.42 TYPE 1 DIABETES MELLITUS WITH DIABETIC POLYNEUROPATHY (MULTI): ICD-10-CM

## 2023-08-17 RX ORDER — INSULIN ASPART 100 [IU]/ML
18 INJECTION, SOLUTION INTRAVENOUS; SUBCUTANEOUS
Qty: 10 ML | Refills: 12 | Status: SHIPPED | OUTPATIENT
Start: 2023-08-17 | End: 2024-02-14 | Stop reason: SDUPTHER

## 2023-08-17 NOTE — TELEPHONE ENCOUNTER
Patient requesting a prescription for NovaLog flex pen 100 unit/ml Sub Q injector 18 units TID on a sliding scale.  5 come in a box.  He said you used to prescribe this for him (in old chart) but switched to Novalin for insurance purposes.  He believes this is giving him a stomach ache and thinks he had better luck on the NovaLog.    Sent referral and records to Dr. Cliff Berman in the [facility] for Endocrinology follow up

## 2023-08-17 NOTE — TELEPHONE ENCOUNTER
Spoke with patient.  Made appointment with Cliff Berman.  Faxed referral and records to:  [phone]

## 2023-08-25 ENCOUNTER — TELEPHONE (OUTPATIENT)
Dept: PRIMARY CARE | Facility: CLINIC | Age: 53
End: 2023-08-25
Payer: MEDICARE

## 2023-08-25 NOTE — TELEPHONE ENCOUNTER
Pt left you a msg stating that he got your msg regarding Endocrinology.  He said all the names you gave him are for providers in the [facility] area.  He lives in [place] and doesn't drive due to being disabled.  He is wanting to see Dr. Jasper Berman, who is a couple blocks away from him.  That provider's # [phone]

## 2023-08-28 NOTE — TELEPHONE ENCOUNTER
Patient asked me for this on 8/17.  Faxed and confirmed on that day.  Patient was also notified but must have forgotten.

## 2023-12-13 ENCOUNTER — APPOINTMENT (OUTPATIENT)
Dept: PRIMARY CARE | Facility: CLINIC | Age: 53
End: 2023-12-13
Payer: MEDICARE

## 2024-01-17 ENCOUNTER — APPOINTMENT (OUTPATIENT)
Dept: PRIMARY CARE | Facility: CLINIC | Age: 54
End: 2024-01-17
Payer: MEDICARE

## 2024-02-10 ENCOUNTER — APPOINTMENT (OUTPATIENT)
Dept: GENERAL RADIOLOGY | Age: 54
DRG: 177 | End: 2024-02-10
Payer: COMMERCIAL

## 2024-02-10 ENCOUNTER — HOSPITAL ENCOUNTER (INPATIENT)
Age: 54
LOS: 3 days | Discharge: NURSING, REHAB OR LONG-TERM CARE | DRG: 177 | End: 2024-02-13
Attending: EMERGENCY MEDICINE | Admitting: INTERNAL MEDICINE
Payer: COMMERCIAL

## 2024-02-10 ENCOUNTER — APPOINTMENT (OUTPATIENT)
Dept: CT IMAGING | Age: 54
DRG: 177 | End: 2024-02-10
Attending: EMERGENCY MEDICINE
Payer: COMMERCIAL

## 2024-02-10 DIAGNOSIS — R73.9 HYPERGLYCEMIA: ICD-10-CM

## 2024-02-10 DIAGNOSIS — J18.9 PNEUMONIA OF RIGHT UPPER LOBE DUE TO INFECTIOUS ORGANISM: ICD-10-CM

## 2024-02-10 DIAGNOSIS — F90.9 ATTENTION DEFICIT HYPERACTIVITY DISORDER (ADHD), UNSPECIFIED ADHD TYPE: ICD-10-CM

## 2024-02-10 DIAGNOSIS — J96.01 ACUTE RESPIRATORY FAILURE WITH HYPOXIA (HCC): ICD-10-CM

## 2024-02-10 DIAGNOSIS — F41.9 ANXIETY: ICD-10-CM

## 2024-02-10 DIAGNOSIS — S72.114A CLOSED NONDISPLACED FRACTURE OF GREATER TROCHANTER OF RIGHT FEMUR, INITIAL ENCOUNTER (HCC): Primary | ICD-10-CM

## 2024-02-10 DIAGNOSIS — G47.00 INSOMNIA, UNSPECIFIED TYPE: ICD-10-CM

## 2024-02-10 PROBLEM — J69.0 ASPIRATION PNEUMONIA OF RIGHT UPPER LOBE, UNSPECIFIED ASPIRATION PNEUMONIA TYPE (HCC): Status: ACTIVE | Noted: 2024-02-10

## 2024-02-10 LAB
ALBUMIN SERPL-MCNC: 3.5 G/DL (ref 3.5–5.2)
ALP SERPL-CCNC: 132 U/L (ref 40–129)
ALT SERPL-CCNC: 15 U/L (ref 0–40)
ANION GAP SERPL CALCULATED.3IONS-SCNC: 17 MMOL/L (ref 7–16)
AST SERPL-CCNC: 12 U/L (ref 0–39)
B-OH-BUTYR SERPL-MCNC: 0.46 MMOL/L (ref 0.02–0.27)
BASOPHILS # BLD: 0.1 K/UL (ref 0–0.2)
BASOPHILS NFR BLD: 1 % (ref 0–2)
BILIRUB SERPL-MCNC: 0.3 MG/DL (ref 0–1.2)
BUN SERPL-MCNC: 20 MG/DL (ref 6–20)
CALCIUM SERPL-MCNC: 9.4 MG/DL (ref 8.6–10.2)
CHLORIDE SERPL-SCNC: 97 MMOL/L (ref 98–107)
CO2 SERPL-SCNC: 18 MMOL/L (ref 22–29)
CREAT SERPL-MCNC: 0.9 MG/DL (ref 0.7–1.2)
CRP SERPL HS-MCNC: 91 MG/L (ref 0–5)
EOSINOPHIL # BLD: 0.05 K/UL (ref 0.05–0.5)
EOSINOPHILS RELATIVE PERCENT: 0 % (ref 0–6)
ERYTHROCYTE [DISTWIDTH] IN BLOOD BY AUTOMATED COUNT: 13.3 % (ref 11.5–15)
FLUAV RNA RESP QL NAA+PROBE: NOT DETECTED
FLUBV RNA RESP QL NAA+PROBE: NOT DETECTED
GFR SERPL CREATININE-BSD FRML MDRD: >60 ML/MIN/1.73M2
GLUCOSE BLD-MCNC: 339 MG/DL (ref 74–99)
GLUCOSE BLD-MCNC: 468 MG/DL (ref 74–99)
GLUCOSE BLD-MCNC: >500 MG/DL (ref 74–99)
GLUCOSE SERPL-MCNC: 416 MG/DL (ref 74–99)
HCT VFR BLD AUTO: 43.9 % (ref 37–54)
HGB BLD-MCNC: 14.6 G/DL (ref 12.5–16.5)
IMM GRANULOCYTES # BLD AUTO: 0.21 K/UL (ref 0–0.58)
IMM GRANULOCYTES NFR BLD: 1 % (ref 0–5)
INR PPP: 1
LYMPHOCYTES NFR BLD: 1.39 K/UL (ref 1.5–4)
LYMPHOCYTES RELATIVE PERCENT: 8 % (ref 20–42)
MCH RBC QN AUTO: 26.6 PG (ref 26–35)
MCHC RBC AUTO-ENTMCNC: 33.3 G/DL (ref 32–34.5)
MCV RBC AUTO: 80.1 FL (ref 80–99.9)
MONOCYTES NFR BLD: 1.34 K/UL (ref 0.1–0.95)
MONOCYTES NFR BLD: 8 % (ref 2–12)
NEUTROPHILS NFR BLD: 83 % (ref 43–80)
NEUTS SEG NFR BLD: 14.89 K/UL (ref 1.8–7.3)
PLATELET # BLD AUTO: 261 K/UL (ref 130–450)
PMV BLD AUTO: 10.6 FL (ref 7–12)
POTASSIUM SERPL-SCNC: 4.5 MMOL/L (ref 3.5–5)
PROCALCITONIN SERPL-MCNC: 0.24 NG/ML (ref 0–0.08)
PROT SERPL-MCNC: 7.3 G/DL (ref 6.4–8.3)
PROTHROMBIN TIME: 11.1 SEC (ref 9.3–12.4)
RBC # BLD AUTO: 5.48 M/UL (ref 3.8–5.8)
SARS-COV-2 RNA RESP QL NAA+PROBE: NOT DETECTED
SODIUM SERPL-SCNC: 132 MMOL/L (ref 132–146)
SOURCE: NORMAL
SPECIMEN DESCRIPTION: NORMAL
WBC OTHER # BLD: 18 K/UL (ref 4.5–11.5)

## 2024-02-10 PROCEDURE — 72192 CT PELVIS W/O DYE: CPT

## 2024-02-10 PROCEDURE — 6360000002 HC RX W HCPCS: Performed by: STUDENT IN AN ORGANIZED HEALTH CARE EDUCATION/TRAINING PROGRAM

## 2024-02-10 PROCEDURE — 87449 NOS EACH ORGANISM AG IA: CPT

## 2024-02-10 PROCEDURE — 73502 X-RAY EXAM HIP UNI 2-3 VIEWS: CPT

## 2024-02-10 PROCEDURE — 84145 PROCALCITONIN (PCT): CPT

## 2024-02-10 PROCEDURE — 96374 THER/PROPH/DIAG INJ IV PUSH: CPT

## 2024-02-10 PROCEDURE — 86140 C-REACTIVE PROTEIN: CPT

## 2024-02-10 PROCEDURE — 6360000004 HC RX CONTRAST MEDICATION: Performed by: RADIOLOGY

## 2024-02-10 PROCEDURE — 85025 COMPLETE CBC W/AUTO DIFF WBC: CPT

## 2024-02-10 PROCEDURE — 99285 EMERGENCY DEPT VISIT HI MDM: CPT

## 2024-02-10 PROCEDURE — 99223 1ST HOSP IP/OBS HIGH 75: CPT | Performed by: STUDENT IN AN ORGANIZED HEALTH CARE EDUCATION/TRAINING PROGRAM

## 2024-02-10 PROCEDURE — 87636 SARSCOV2 & INF A&B AMP PRB: CPT

## 2024-02-10 PROCEDURE — 82962 GLUCOSE BLOOD TEST: CPT

## 2024-02-10 PROCEDURE — 73552 X-RAY EXAM OF FEMUR 2/>: CPT

## 2024-02-10 PROCEDURE — 36415 COLL VENOUS BLD VENIPUNCTURE: CPT

## 2024-02-10 PROCEDURE — 2500000003 HC RX 250 WO HCPCS: Performed by: EMERGENCY MEDICINE

## 2024-02-10 PROCEDURE — 1200000000 HC SEMI PRIVATE

## 2024-02-10 PROCEDURE — 2580000003 HC RX 258: Performed by: EMERGENCY MEDICINE

## 2024-02-10 PROCEDURE — 93005 ELECTROCARDIOGRAM TRACING: CPT | Performed by: EMERGENCY MEDICINE

## 2024-02-10 PROCEDURE — 87899 AGENT NOS ASSAY W/OPTIC: CPT

## 2024-02-10 PROCEDURE — 96376 TX/PRO/DX INJ SAME DRUG ADON: CPT

## 2024-02-10 PROCEDURE — 82010 KETONE BODYS QUAN: CPT

## 2024-02-10 PROCEDURE — 71275 CT ANGIOGRAPHY CHEST: CPT

## 2024-02-10 PROCEDURE — 6360000002 HC RX W HCPCS: Performed by: EMERGENCY MEDICINE

## 2024-02-10 PROCEDURE — 6370000000 HC RX 637 (ALT 250 FOR IP): Performed by: STUDENT IN AN ORGANIZED HEALTH CARE EDUCATION/TRAINING PROGRAM

## 2024-02-10 PROCEDURE — 80053 COMPREHEN METABOLIC PANEL: CPT

## 2024-02-10 PROCEDURE — 85610 PROTHROMBIN TIME: CPT

## 2024-02-10 PROCEDURE — 2580000003 HC RX 258: Performed by: STUDENT IN AN ORGANIZED HEALTH CARE EDUCATION/TRAINING PROGRAM

## 2024-02-10 PROCEDURE — 6370000000 HC RX 637 (ALT 250 FOR IP)

## 2024-02-10 RX ORDER — LOSARTAN POTASSIUM 50 MG/1
50 TABLET ORAL DAILY
Status: DISCONTINUED | OUTPATIENT
Start: 2024-02-10 | End: 2024-02-13 | Stop reason: HOSPADM

## 2024-02-10 RX ORDER — PANTOPRAZOLE SODIUM 40 MG/1
40 TABLET, DELAYED RELEASE ORAL
Status: DISCONTINUED | OUTPATIENT
Start: 2024-02-11 | End: 2024-02-13 | Stop reason: HOSPADM

## 2024-02-10 RX ORDER — ONDANSETRON 4 MG/1
4 TABLET, ORALLY DISINTEGRATING ORAL EVERY 8 HOURS PRN
Status: DISCONTINUED | OUTPATIENT
Start: 2024-02-10 | End: 2024-02-13 | Stop reason: HOSPADM

## 2024-02-10 RX ORDER — SODIUM CHLORIDE 0.9 % (FLUSH) 0.9 %
5-40 SYRINGE (ML) INJECTION PRN
Status: DISCONTINUED | OUTPATIENT
Start: 2024-02-10 | End: 2024-02-13 | Stop reason: HOSPADM

## 2024-02-10 RX ORDER — QUETIAPINE 150 MG/1
150 TABLET, FILM COATED, EXTENDED RELEASE ORAL DAILY
Status: DISCONTINUED | OUTPATIENT
Start: 2024-02-11 | End: 2024-02-13

## 2024-02-10 RX ORDER — DEXTROAMPHETAMINE SACCHARATE, AMPHETAMINE ASPARTATE, DEXTROAMPHETAMINE SULFATE AND AMPHETAMINE SULFATE 2.5; 2.5; 2.5; 2.5 MG/1; MG/1; MG/1; MG/1
30 TABLET ORAL 2 TIMES DAILY
Status: DISCONTINUED | OUTPATIENT
Start: 2024-02-11 | End: 2024-02-13 | Stop reason: HOSPADM

## 2024-02-10 RX ORDER — 0.9 % SODIUM CHLORIDE 0.9 %
1000 INTRAVENOUS SOLUTION INTRAVENOUS ONCE
Status: COMPLETED | OUTPATIENT
Start: 2024-02-10 | End: 2024-02-10

## 2024-02-10 RX ORDER — SODIUM CHLORIDE 9 MG/ML
INJECTION, SOLUTION INTRAVENOUS PRN
Status: DISCONTINUED | OUTPATIENT
Start: 2024-02-10 | End: 2024-02-13 | Stop reason: HOSPADM

## 2024-02-10 RX ORDER — ONDANSETRON 2 MG/ML
4 INJECTION INTRAMUSCULAR; INTRAVENOUS EVERY 6 HOURS PRN
Status: DISCONTINUED | OUTPATIENT
Start: 2024-02-10 | End: 2024-02-13 | Stop reason: HOSPADM

## 2024-02-10 RX ORDER — MORPHINE SULFATE 2 MG/ML
2 INJECTION, SOLUTION INTRAMUSCULAR; INTRAVENOUS EVERY 4 HOURS PRN
Status: DISCONTINUED | OUTPATIENT
Start: 2024-02-10 | End: 2024-02-13

## 2024-02-10 RX ORDER — OXYCODONE HYDROCHLORIDE AND ACETAMINOPHEN 5; 325 MG/1; MG/1
1 TABLET ORAL EVERY 4 HOURS PRN
Status: DISCONTINUED | OUTPATIENT
Start: 2024-02-10 | End: 2024-02-13

## 2024-02-10 RX ORDER — ZOLPIDEM TARTRATE 5 MG/1
10 TABLET ORAL NIGHTLY PRN
Status: DISCONTINUED | OUTPATIENT
Start: 2024-02-10 | End: 2024-02-13 | Stop reason: HOSPADM

## 2024-02-10 RX ORDER — MAGNESIUM SULFATE IN WATER 40 MG/ML
2000 INJECTION, SOLUTION INTRAVENOUS PRN
Status: DISCONTINUED | OUTPATIENT
Start: 2024-02-10 | End: 2024-02-13 | Stop reason: HOSPADM

## 2024-02-10 RX ORDER — INSULIN LISPRO 100 [IU]/ML
0-4 INJECTION, SOLUTION INTRAVENOUS; SUBCUTANEOUS NIGHTLY
Status: DISCONTINUED | OUTPATIENT
Start: 2024-02-10 | End: 2024-02-12 | Stop reason: SDUPTHER

## 2024-02-10 RX ORDER — HYDRALAZINE HYDROCHLORIDE 20 MG/ML
10 INJECTION INTRAMUSCULAR; INTRAVENOUS EVERY 6 HOURS PRN
Status: DISCONTINUED | OUTPATIENT
Start: 2024-02-10 | End: 2024-02-13 | Stop reason: HOSPADM

## 2024-02-10 RX ORDER — POTASSIUM CHLORIDE 20 MEQ/1
40 TABLET, EXTENDED RELEASE ORAL PRN
Status: DISCONTINUED | OUTPATIENT
Start: 2024-02-10 | End: 2024-02-13 | Stop reason: HOSPADM

## 2024-02-10 RX ORDER — POTASSIUM CHLORIDE 7.45 MG/ML
10 INJECTION INTRAVENOUS PRN
Status: DISCONTINUED | OUTPATIENT
Start: 2024-02-10 | End: 2024-02-13 | Stop reason: HOSPADM

## 2024-02-10 RX ORDER — POLYETHYLENE GLYCOL 3350 17 G/17G
17 POWDER, FOR SOLUTION ORAL DAILY PRN
Status: DISCONTINUED | OUTPATIENT
Start: 2024-02-10 | End: 2024-02-13 | Stop reason: HOSPADM

## 2024-02-10 RX ORDER — ACETAMINOPHEN 650 MG/1
650 SUPPOSITORY RECTAL EVERY 6 HOURS PRN
Status: DISCONTINUED | OUTPATIENT
Start: 2024-02-10 | End: 2024-02-13 | Stop reason: HOSPADM

## 2024-02-10 RX ORDER — DEXTROAMPHETAMINE SACCHARATE, AMPHETAMINE ASPARTATE, DEXTROAMPHETAMINE SULFATE AND AMPHETAMINE SULFATE 7.5; 7.5; 7.5; 7.5 MG/1; MG/1; MG/1; MG/1
30 TABLET ORAL 2 TIMES DAILY
Status: ON HOLD | COMMUNITY
End: 2024-02-13

## 2024-02-10 RX ORDER — ALPRAZOLAM 1 MG/1
1 TABLET ORAL 3 TIMES DAILY PRN
Status: DISCONTINUED | OUTPATIENT
Start: 2024-02-10 | End: 2024-02-13 | Stop reason: HOSPADM

## 2024-02-10 RX ORDER — MORPHINE SULFATE 4 MG/ML
4 INJECTION, SOLUTION INTRAMUSCULAR; INTRAVENOUS ONCE
Status: COMPLETED | OUTPATIENT
Start: 2024-02-10 | End: 2024-02-10

## 2024-02-10 RX ORDER — SODIUM CHLORIDE 0.9 % (FLUSH) 0.9 %
5-40 SYRINGE (ML) INJECTION EVERY 12 HOURS SCHEDULED
Status: DISCONTINUED | OUTPATIENT
Start: 2024-02-10 | End: 2024-02-13 | Stop reason: HOSPADM

## 2024-02-10 RX ORDER — INSULIN LISPRO 100 [IU]/ML
0-16 INJECTION, SOLUTION INTRAVENOUS; SUBCUTANEOUS
Status: DISCONTINUED | OUTPATIENT
Start: 2024-02-10 | End: 2024-02-12

## 2024-02-10 RX ORDER — METRONIDAZOLE 500 MG/100ML
500 INJECTION, SOLUTION INTRAVENOUS EVERY 8 HOURS
Status: DISCONTINUED | OUTPATIENT
Start: 2024-02-10 | End: 2024-02-13

## 2024-02-10 RX ORDER — ACETAMINOPHEN 325 MG/1
650 TABLET ORAL EVERY 6 HOURS PRN
Status: DISCONTINUED | OUTPATIENT
Start: 2024-02-10 | End: 2024-02-13 | Stop reason: HOSPADM

## 2024-02-10 RX ORDER — INSULIN GLARGINE 100 [IU]/ML
50 INJECTION, SOLUTION SUBCUTANEOUS 2 TIMES DAILY
Status: DISCONTINUED | OUTPATIENT
Start: 2024-02-10 | End: 2024-02-12

## 2024-02-10 RX ORDER — HYDRALAZINE HYDROCHLORIDE 20 MG/ML
20 INJECTION INTRAMUSCULAR; INTRAVENOUS ONCE
Status: DISCONTINUED | OUTPATIENT
Start: 2024-02-10 | End: 2024-02-13

## 2024-02-10 RX ORDER — METOPROLOL TARTRATE 50 MG/1
100 TABLET, FILM COATED ORAL 2 TIMES DAILY
Status: DISCONTINUED | OUTPATIENT
Start: 2024-02-10 | End: 2024-02-13 | Stop reason: HOSPADM

## 2024-02-10 RX ORDER — ENOXAPARIN SODIUM 100 MG/ML
40 INJECTION SUBCUTANEOUS DAILY
Status: DISCONTINUED | OUTPATIENT
Start: 2024-02-10 | End: 2024-02-13 | Stop reason: HOSPADM

## 2024-02-10 RX ADMIN — WATER 1000 MG: 1 INJECTION INTRAMUSCULAR; INTRAVENOUS; SUBCUTANEOUS at 17:47

## 2024-02-10 RX ADMIN — SODIUM CHLORIDE 1000 ML: 9 INJECTION, SOLUTION INTRAVENOUS at 15:55

## 2024-02-10 RX ADMIN — INSULIN LISPRO 16 UNITS: 100 INJECTION, SOLUTION INTRAVENOUS; SUBCUTANEOUS at 21:44

## 2024-02-10 RX ADMIN — IOPAMIDOL 75 ML: 755 INJECTION, SOLUTION INTRAVENOUS at 16:13

## 2024-02-10 RX ADMIN — METOPROLOL TARTRATE 100 MG: 50 TABLET ORAL at 21:26

## 2024-02-10 RX ADMIN — OXYCODONE HYDROCHLORIDE AND ACETAMINOPHEN 1 TABLET: 5; 325 TABLET ORAL at 21:27

## 2024-02-10 RX ADMIN — DOXYCYCLINE 100 MG: 100 INJECTION, POWDER, LYOPHILIZED, FOR SOLUTION INTRAVENOUS at 17:48

## 2024-02-10 RX ADMIN — INSULIN GLARGINE 50 UNITS: 100 INJECTION, SOLUTION SUBCUTANEOUS at 21:00

## 2024-02-10 RX ADMIN — LOSARTAN POTASSIUM 50 MG: 50 TABLET, FILM COATED ORAL at 21:26

## 2024-02-10 RX ADMIN — MORPHINE SULFATE 4 MG: 4 INJECTION, SOLUTION INTRAMUSCULAR; INTRAVENOUS at 17:46

## 2024-02-10 RX ADMIN — INSULIN LISPRO 4 UNITS: 100 INJECTION, SOLUTION INTRAVENOUS; SUBCUTANEOUS at 21:43

## 2024-02-10 RX ADMIN — METRONIDAZOLE 500 MG: 500 INJECTION, SOLUTION INTRAVENOUS at 21:00

## 2024-02-10 RX ADMIN — SODIUM CHLORIDE, PRESERVATIVE FREE 10 ML: 5 INJECTION INTRAVENOUS at 21:35

## 2024-02-10 RX ADMIN — MORPHINE SULFATE 4 MG: 4 INJECTION, SOLUTION INTRAMUSCULAR; INTRAVENOUS at 14:01

## 2024-02-10 NOTE — ED PROVIDER NOTES
DISPOSITION  Disposition: Admit to telemetry  Patient condition is stable      NOTE: This report was transcribed using voice recognition software. Every effort was made to ensure accuracy; however, inadvertent computerized transcription errors may be present    IMary MD, am the primary provider of this record        Mary Hernandez MD  02/10/24 2040

## 2024-02-10 NOTE — H&P
Results   Component Value Date    PGAUAKSO50 545 11/20/2013   ,   Lab Results   Component Value Date    FOLATE >24.0 11/20/2013     Lactic Acid:   Lab Results   Component Value Date    LACTA 3.0 (H) 02/06/2019     Thyroid Studies: No results found for: \"TSH\", \"B6EVYTP\", \"L5KIXLB\", \"THYROIDAB\"    Oupatient labs:  Lab Results   Component Value Date    TRIG 229 (H) 02/06/2019    INR 1.0 02/10/2024    LABA1C 15.2 (H) 04/12/2015       Urinalysis:    Lab Results   Component Value Date/Time    NITRU Negative 02/06/2019 01:52 PM    WBCUA 1-3 02/06/2019 01:52 PM    BACTERIA FEW 02/06/2019 01:52 PM    RBCUA 1-3 02/06/2019 01:52 PM    RBCUA 2-5 02/13/2014 05:50 AM    BLOODU MODERATE 02/06/2019 01:52 PM    SPECGRAV 1.020 02/06/2019 01:52 PM    GLUCOSEU >=1000 02/06/2019 01:52 PM       Imaging:  CT PELVIS WO CONTRAST Additional Contrast? None    Result Date: 2/10/2024  EXAMINATION: CT OF THE PELVIS WITHOUT CONTRAST 2/10/2024 4:10 pm TECHNIQUE: CT of the pelvis was performed without the administration of intravenous contrast.  Multiplanar reformatted images are provided for review. Adjustment of mA and/or kV according to patient size was utilized.  Automated exposure control, iterative reconstruction, and/or weight based adjustment of the mA/kV was utilized to reduce the radiation dose to as low as reasonably achievable. COMPARISON: None. HISTORY ORDERING SYSTEM PROVIDED HISTORY: fall, right hip pain TECHNOLOGIST PROVIDED HISTORY: Reason for exam:->fall, right hip pain Additional Contrast?->None Decision Support Exception - unselect if not a suspected or confirmed emergency medical condition->Emergency Medical Condition (MA) What reading provider will be dictating this exam?->CRC FINDINGS: Bones: There is evidence of an acute fracture without significant dislocation involving the greater trochanter of the right femur.  There several tiny fragments adjacent to the femoral shaft and neck.  There is soft tissue swelling of the

## 2024-02-11 ENCOUNTER — APPOINTMENT (OUTPATIENT)
Dept: MRI IMAGING | Age: 54
DRG: 177 | End: 2024-02-11
Payer: COMMERCIAL
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
## 2024-02-11 PROBLEM — Z79.899 CHRONIC PRESCRIPTION BENZODIAZEPINE USE: Status: ACTIVE | Noted: 2024-02-11

## 2024-02-11 PROBLEM — G47.33 OBSTRUCTIVE SLEEP APNEA: Status: ACTIVE | Noted: 2024-02-11

## 2024-02-11 PROBLEM — Z79.899 POLYPHARMACY: Status: ACTIVE | Noted: 2024-02-11

## 2024-02-11 PROBLEM — E66.812 CLASS 2 SEVERE OBESITY DUE TO EXCESS CALORIES WITH SERIOUS COMORBIDITY AND BODY MASS INDEX (BMI) OF 35.0 TO 35.9 IN ADULT: Status: ACTIVE | Noted: 2024-02-11

## 2024-02-11 PROBLEM — E66.01 CLASS 2 SEVERE OBESITY DUE TO EXCESS CALORIES WITH SERIOUS COMORBIDITY AND BODY MASS INDEX (BMI) OF 35.0 TO 35.9 IN ADULT (HCC): Status: ACTIVE | Noted: 2024-02-11

## 2024-02-11 LAB
ANION GAP SERPL CALCULATED.3IONS-SCNC: 13 MMOL/L (ref 7–16)
B.E.: -1.9 MMOL/L (ref -3–3)
BASOPHILS # BLD: 0.1 K/UL (ref 0–0.2)
BASOPHILS NFR BLD: 1 % (ref 0–2)
BUN SERPL-MCNC: 19 MG/DL (ref 6–20)
CALCIUM SERPL-MCNC: 9.2 MG/DL (ref 8.6–10.2)
CHLORIDE SERPL-SCNC: 102 MMOL/L (ref 98–107)
CHOLEST SERPL-MCNC: 194 MG/DL
CO2 SERPL-SCNC: 23 MMOL/L (ref 22–29)
COHB: 1.2 % (ref 0–1.5)
CREAT SERPL-MCNC: 0.9 MG/DL (ref 0.7–1.2)
CRITICAL: ABNORMAL
DATE ANALYZED: ABNORMAL
DATE OF COLLECTION: ABNORMAL
EOSINOPHIL # BLD: 0.29 K/UL (ref 0.05–0.5)
EOSINOPHILS RELATIVE PERCENT: 2 % (ref 0–6)
ERYTHROCYTE [DISTWIDTH] IN BLOOD BY AUTOMATED COUNT: 13.8 % (ref 11.5–15)
GFR SERPL CREATININE-BSD FRML MDRD: >60 ML/MIN/1.73M2
GLUCOSE BLD-MCNC: 141 MG/DL (ref 74–99)
GLUCOSE BLD-MCNC: 258 MG/DL (ref 74–99)
GLUCOSE BLD-MCNC: 297 MG/DL (ref 74–99)
GLUCOSE BLD-MCNC: 371 MG/DL (ref 74–99)
GLUCOSE SERPL-MCNC: 123 MG/DL (ref 74–99)
HBA1C MFR BLD: 12.9 % (ref 4–5.6)
HCO3: 21 MMOL/L (ref 22–26)
HCT VFR BLD AUTO: 48.8 % (ref 37–54)
HDLC SERPL-MCNC: 43 MG/DL
HGB BLD-MCNC: 16.2 G/DL (ref 12.5–16.5)
HHB: 3.4 % (ref 0–5)
IMM GRANULOCYTES # BLD AUTO: 0.19 K/UL (ref 0–0.58)
IMM GRANULOCYTES NFR BLD: 2 % (ref 0–5)
LAB: ABNORMAL
LDLC SERPL CALC-MCNC: 100 MG/DL
LYMPHOCYTES NFR BLD: 1.58 K/UL (ref 1.5–4)
LYMPHOCYTES RELATIVE PERCENT: 13 % (ref 20–42)
Lab: 10
MCH RBC QN AUTO: 27.6 PG (ref 26–35)
MCHC RBC AUTO-ENTMCNC: 33.2 G/DL (ref 32–34.5)
MCV RBC AUTO: 83 FL (ref 80–99.9)
METHB: 0.5 % (ref 0–1.5)
MODE: ABNORMAL
MONOCYTES NFR BLD: 1.31 K/UL (ref 0.1–0.95)
MONOCYTES NFR BLD: 11 % (ref 2–12)
NEUTROPHILS NFR BLD: 72 % (ref 43–80)
NEUTS SEG NFR BLD: 8.85 K/UL (ref 1.8–7.3)
O2 CONTENT: 19.8 ML/DL
O2 SATURATION: 96.5 % (ref 92–98.5)
O2HB: 94.9 % (ref 94–97)
OPERATOR ID: 2593
PATIENT TEMP: 37 C
PCO2: 31.1 MMHG (ref 35–45)
PH BLOOD GAS: 7.45 (ref 7.35–7.45)
PLATELET # BLD AUTO: 253 K/UL (ref 130–450)
PMV BLD AUTO: 10.1 FL (ref 7–12)
PO2: 84.5 MMHG (ref 75–100)
POTASSIUM SERPL-SCNC: 3.6 MMOL/L (ref 3.5–5)
RBC # BLD AUTO: 5.88 M/UL (ref 3.8–5.8)
SODIUM SERPL-SCNC: 138 MMOL/L (ref 132–146)
SOURCE, BLOOD GAS: ABNORMAL
THB: 14.8 G/DL (ref 11.5–16.5)
TIME ANALYZED: 12
TRIGL SERPL-MCNC: 255 MG/DL
VLDLC SERPL CALC-MCNC: 51 MG/DL
WBC OTHER # BLD: 12.3 K/UL (ref 4.5–11.5)

## 2024-02-11 PROCEDURE — 82805 BLOOD GASES W/O2 SATURATION: CPT

## 2024-02-11 PROCEDURE — 80061 LIPID PANEL: CPT

## 2024-02-11 PROCEDURE — 80048 BASIC METABOLIC PNL TOTAL CA: CPT

## 2024-02-11 PROCEDURE — 92526 ORAL FUNCTION THERAPY: CPT | Performed by: SPEECH-LANGUAGE PATHOLOGIST

## 2024-02-11 PROCEDURE — 99233 SBSQ HOSP IP/OBS HIGH 50: CPT | Performed by: INTERNAL MEDICINE

## 2024-02-11 PROCEDURE — 36415 COLL VENOUS BLD VENIPUNCTURE: CPT

## 2024-02-11 PROCEDURE — 6370000000 HC RX 637 (ALT 250 FOR IP): Performed by: STUDENT IN AN ORGANIZED HEALTH CARE EDUCATION/TRAINING PROGRAM

## 2024-02-11 PROCEDURE — 73721 MRI JNT OF LWR EXTRE W/O DYE: CPT

## 2024-02-11 PROCEDURE — 6360000002 HC RX W HCPCS: Performed by: STUDENT IN AN ORGANIZED HEALTH CARE EDUCATION/TRAINING PROGRAM

## 2024-02-11 PROCEDURE — 82962 GLUCOSE BLOOD TEST: CPT

## 2024-02-11 PROCEDURE — 83036 HEMOGLOBIN GLYCOSYLATED A1C: CPT

## 2024-02-11 PROCEDURE — 92610 EVALUATE SWALLOWING FUNCTION: CPT | Performed by: SPEECH-LANGUAGE PATHOLOGIST

## 2024-02-11 PROCEDURE — 1200000000 HC SEMI PRIVATE

## 2024-02-11 PROCEDURE — 6370000000 HC RX 637 (ALT 250 FOR IP)

## 2024-02-11 PROCEDURE — 6360000002 HC RX W HCPCS

## 2024-02-11 PROCEDURE — 85025 COMPLETE CBC W/AUTO DIFF WBC: CPT

## 2024-02-11 PROCEDURE — 2580000003 HC RX 258: Performed by: STUDENT IN AN ORGANIZED HEALTH CARE EDUCATION/TRAINING PROGRAM

## 2024-02-11 RX ORDER — GLUCAGON 1 MG/ML
1 KIT INJECTION PRN
Status: DISCONTINUED | OUTPATIENT
Start: 2024-02-11 | End: 2024-02-13 | Stop reason: HOSPADM

## 2024-02-11 RX ORDER — DEXTROSE MONOHYDRATE 100 MG/ML
INJECTION, SOLUTION INTRAVENOUS CONTINUOUS PRN
Status: DISCONTINUED | OUTPATIENT
Start: 2024-02-11 | End: 2024-02-13 | Stop reason: HOSPADM

## 2024-02-11 RX ORDER — INSULIN LISPRO 100 [IU]/ML
10 INJECTION, SOLUTION INTRAVENOUS; SUBCUTANEOUS ONCE
Status: COMPLETED | OUTPATIENT
Start: 2024-02-11 | End: 2024-02-11

## 2024-02-11 RX ADMIN — MORPHINE SULFATE 2 MG: 2 INJECTION, SOLUTION INTRAMUSCULAR; INTRAVENOUS at 09:35

## 2024-02-11 RX ADMIN — METRONIDAZOLE 500 MG: 500 INJECTION, SOLUTION INTRAVENOUS at 22:20

## 2024-02-11 RX ADMIN — INSULIN GLARGINE 50 UNITS: 100 INJECTION, SOLUTION SUBCUTANEOUS at 21:59

## 2024-02-11 RX ADMIN — INSULIN GLARGINE 50 UNITS: 100 INJECTION, SOLUTION SUBCUTANEOUS at 09:04

## 2024-02-11 RX ADMIN — LOSARTAN POTASSIUM 50 MG: 50 TABLET, FILM COATED ORAL at 09:04

## 2024-02-11 RX ADMIN — ALPRAZOLAM 1 MG: 1 TABLET ORAL at 11:42

## 2024-02-11 RX ADMIN — METRONIDAZOLE 500 MG: 500 INJECTION, SOLUTION INTRAVENOUS at 05:37

## 2024-02-11 RX ADMIN — PANTOPRAZOLE SODIUM 40 MG: 40 TABLET, DELAYED RELEASE ORAL at 05:34

## 2024-02-11 RX ADMIN — SODIUM CHLORIDE, PRESERVATIVE FREE 10 ML: 5 INJECTION INTRAVENOUS at 09:07

## 2024-02-11 RX ADMIN — SODIUM CHLORIDE, PRESERVATIVE FREE 10 ML: 5 INJECTION INTRAVENOUS at 09:36

## 2024-02-11 RX ADMIN — QUETIAPINE FUMARATE 150 MG: 150 TABLET, EXTENDED RELEASE ORAL at 09:09

## 2024-02-11 RX ADMIN — METOPROLOL TARTRATE 100 MG: 50 TABLET ORAL at 09:04

## 2024-02-11 RX ADMIN — SODIUM CHLORIDE, PRESERVATIVE FREE 10 ML: 5 INJECTION INTRAVENOUS at 21:59

## 2024-02-11 RX ADMIN — DEXTROAMPHETAMINE SACCHARATE, AMPHETAMINE ASPARTATE, DEXTROAMPHETAMINE SULFATE, AMPHETAMINE SULFATE TABLETS, 10 MG,CLL 30 MG: 2.5; 2.5; 2.5; 2.5 TABLET ORAL at 09:04

## 2024-02-11 RX ADMIN — INSULIN LISPRO 8 UNITS: 100 INJECTION, SOLUTION INTRAVENOUS; SUBCUTANEOUS at 11:23

## 2024-02-11 RX ADMIN — MORPHINE SULFATE 2 MG: 2 INJECTION, SOLUTION INTRAMUSCULAR; INTRAVENOUS at 05:33

## 2024-02-11 RX ADMIN — INSULIN LISPRO 10 UNITS: 100 INJECTION, SOLUTION INTRAVENOUS; SUBCUTANEOUS at 01:44

## 2024-02-11 RX ADMIN — OXYCODONE HYDROCHLORIDE AND ACETAMINOPHEN 1 TABLET: 5; 325 TABLET ORAL at 01:40

## 2024-02-11 RX ADMIN — MORPHINE SULFATE 2 MG: 2 INJECTION, SOLUTION INTRAMUSCULAR; INTRAVENOUS at 15:17

## 2024-02-11 RX ADMIN — INSULIN LISPRO 16 UNITS: 100 INJECTION, SOLUTION INTRAVENOUS; SUBCUTANEOUS at 16:48

## 2024-02-11 RX ADMIN — METOPROLOL TARTRATE 100 MG: 50 TABLET ORAL at 22:00

## 2024-02-11 RX ADMIN — MORPHINE SULFATE 2 MG: 2 INJECTION, SOLUTION INTRAMUSCULAR; INTRAVENOUS at 21:59

## 2024-02-11 RX ADMIN — METRONIDAZOLE 500 MG: 500 INJECTION, SOLUTION INTRAVENOUS at 13:58

## 2024-02-11 RX ADMIN — WATER 1000 MG: 1 INJECTION INTRAMUSCULAR; INTRAVENOUS; SUBCUTANEOUS at 10:11

## 2024-02-11 RX ADMIN — DEXTROAMPHETAMINE SACCHARATE, AMPHETAMINE ASPARTATE, DEXTROAMPHETAMINE SULFATE, AMPHETAMINE SULFATE TABLETS, 10 MG,CLL 30 MG: 2.5; 2.5; 2.5; 2.5 TABLET ORAL at 13:52

## 2024-02-11 NOTE — CONSULTS
Patient able to flex/extend fingers, wrist, elbow and shoulder with active and passive ROM without pain, +2/4 Radial pulse, compartments soft and compressible.    leftLE: No obvious signs of trauma.   -TTP to foot, ankle, leg, knee, thigh, hip.-- Patient able to flex/extend toes, ankle, knee and hip with active and passive ROM without pain,+2/4 DP & PT pulses, compartments soft and compressible.    Pelvis: -TTP, -Log roll, -Heel strike         DATA:    CBC:   Lab Results   Component Value Date/Time    WBC 12.3 02/11/2024 05:49 AM    RBC 5.88 02/11/2024 05:49 AM    HGB 16.2 02/11/2024 05:49 AM    HCT 48.8 02/11/2024 05:49 AM    MCV 83.0 02/11/2024 05:49 AM    MCH 27.6 02/11/2024 05:49 AM    MCHC 33.2 02/11/2024 05:49 AM    RDW 13.8 02/11/2024 05:49 AM     02/11/2024 05:49 AM    MPV 10.1 02/11/2024 05:49 AM     PT/INR:    Lab Results   Component Value Date/Time    PROTIME 11.1 02/10/2024 02:03 PM    INR 1.0 02/10/2024 02:03 PM       Radiology Review:  X-ray right hip demonstrates step-off of the greater trochanter indicating greater trochanteric fracture.  Fracture is not well-visualized.  No other fracture dislocations noted.    CT right hip demonstrates a comminuted greater trochanteric fracture with no intertrochanteric extension.  There is very slight extension into the very superior distal aspect of the femoral neck.  No other fracture dislocations noted.    IMPRESSION:  Right greater trochanteric fracture    PLAN:  Discussed radiographic and physical exam findings the patient today.  We did discuss attempting nonoperative management with physical therapy.  If he fails conservative management we will consider operative invention at that point.  We will also order an MRI of the right hip to evaluate if there is any extension through the intertrochanteric region.  He is understanding and agreeable with this plan.  Any and all questions were answered for him today.  Weight-bear as tolerated right lower

## 2024-02-11 NOTE — PLAN OF CARE
Problem: Safety - Adult  Goal: Free from fall injury  2/11/2024 1311 by Luanne Briones RN  Outcome: Progressing     Problem: ABCDS Injury Assessment  Goal: Absence of physical injury  2/11/2024 1311 by Luanne Briones RN  Outcome: Progressing

## 2024-02-12 ENCOUNTER — APPOINTMENT (OUTPATIENT)
Dept: GENERAL RADIOLOGY | Age: 54
DRG: 177 | End: 2024-02-12
Payer: COMMERCIAL

## 2024-02-12 LAB
EKG ATRIAL RATE: 82 BPM
EKG P AXIS: 23 DEGREES
EKG P-R INTERVAL: 180 MS
EKG Q-T INTERVAL: 404 MS
EKG QRS DURATION: 96 MS
EKG QTC CALCULATION (BAZETT): 472 MS
EKG R AXIS: -8 DEGREES
EKG T AXIS: 54 DEGREES
EKG VENTRICULAR RATE: 82 BPM
GLUCOSE BLD-MCNC: 236 MG/DL (ref 74–99)
GLUCOSE BLD-MCNC: 287 MG/DL (ref 74–99)
GLUCOSE BLD-MCNC: 339 MG/DL (ref 74–99)
GLUCOSE BLD-MCNC: 82 MG/DL (ref 74–99)
L PNEUMO1 AG UR QL IA.RAPID: NEGATIVE
S PNEUM AG SPEC QL: NEGATIVE
SPECIMEN SOURCE: NORMAL

## 2024-02-12 PROCEDURE — 97166 OT EVAL MOD COMPLEX 45 MIN: CPT

## 2024-02-12 PROCEDURE — 97530 THERAPEUTIC ACTIVITIES: CPT

## 2024-02-12 PROCEDURE — 1200000000 HC SEMI PRIVATE

## 2024-02-12 PROCEDURE — 2500000003 HC RX 250 WO HCPCS: Performed by: PHYSICIAN ASSISTANT

## 2024-02-12 PROCEDURE — 6370000000 HC RX 637 (ALT 250 FOR IP)

## 2024-02-12 PROCEDURE — 97161 PT EVAL LOW COMPLEX 20 MIN: CPT

## 2024-02-12 PROCEDURE — 74230 X-RAY XM SWLNG FUNCJ C+: CPT

## 2024-02-12 PROCEDURE — 6360000002 HC RX W HCPCS: Performed by: STUDENT IN AN ORGANIZED HEALTH CARE EDUCATION/TRAINING PROGRAM

## 2024-02-12 PROCEDURE — 92611 MOTION FLUOROSCOPY/SWALLOW: CPT

## 2024-02-12 PROCEDURE — 6360000002 HC RX W HCPCS

## 2024-02-12 PROCEDURE — 99232 SBSQ HOSP IP/OBS MODERATE 35: CPT | Performed by: INTERNAL MEDICINE

## 2024-02-12 PROCEDURE — 92526 ORAL FUNCTION THERAPY: CPT

## 2024-02-12 PROCEDURE — 6370000000 HC RX 637 (ALT 250 FOR IP): Performed by: STUDENT IN AN ORGANIZED HEALTH CARE EDUCATION/TRAINING PROGRAM

## 2024-02-12 PROCEDURE — 2580000003 HC RX 258: Performed by: STUDENT IN AN ORGANIZED HEALTH CARE EDUCATION/TRAINING PROGRAM

## 2024-02-12 PROCEDURE — 82962 GLUCOSE BLOOD TEST: CPT

## 2024-02-12 PROCEDURE — 6370000000 HC RX 637 (ALT 250 FOR IP): Performed by: INTERNAL MEDICINE

## 2024-02-12 RX ORDER — INSULIN LISPRO 100 [IU]/ML
0-8 INJECTION, SOLUTION INTRAVENOUS; SUBCUTANEOUS
Status: DISCONTINUED | OUTPATIENT
Start: 2024-02-12 | End: 2024-02-13 | Stop reason: HOSPADM

## 2024-02-12 RX ORDER — INSULIN LISPRO 100 [IU]/ML
15 INJECTION, SOLUTION INTRAVENOUS; SUBCUTANEOUS
Status: DISCONTINUED | OUTPATIENT
Start: 2024-02-12 | End: 2024-02-13

## 2024-02-12 RX ORDER — INSULIN LISPRO 100 [IU]/ML
0-4 INJECTION, SOLUTION INTRAVENOUS; SUBCUTANEOUS NIGHTLY
Status: DISCONTINUED | OUTPATIENT
Start: 2024-02-12 | End: 2024-02-13 | Stop reason: HOSPADM

## 2024-02-12 RX ADMIN — INSULIN LISPRO 15 UNITS: 100 INJECTION, SOLUTION INTRAVENOUS; SUBCUTANEOUS at 12:32

## 2024-02-12 RX ADMIN — METOPROLOL TARTRATE 100 MG: 50 TABLET ORAL at 20:13

## 2024-02-12 RX ADMIN — INSULIN LISPRO 4 UNITS: 100 INJECTION, SOLUTION INTRAVENOUS; SUBCUTANEOUS at 20:22

## 2024-02-12 RX ADMIN — OXYCODONE HYDROCHLORIDE AND ACETAMINOPHEN 1 TABLET: 5; 325 TABLET ORAL at 12:26

## 2024-02-12 RX ADMIN — INSULIN LISPRO 4 UNITS: 100 INJECTION, SOLUTION INTRAVENOUS; SUBCUTANEOUS at 12:26

## 2024-02-12 RX ADMIN — ACETAMINOPHEN 650 MG: 325 TABLET ORAL at 10:41

## 2024-02-12 RX ADMIN — MORPHINE SULFATE 2 MG: 2 INJECTION, SOLUTION INTRAMUSCULAR; INTRAVENOUS at 05:41

## 2024-02-12 RX ADMIN — SODIUM CHLORIDE, PRESERVATIVE FREE 10 ML: 5 INJECTION INTRAVENOUS at 20:13

## 2024-02-12 RX ADMIN — WATER 1000 MG: 1 INJECTION INTRAMUSCULAR; INTRAVENOUS; SUBCUTANEOUS at 12:27

## 2024-02-12 RX ADMIN — METRONIDAZOLE 500 MG: 500 INJECTION, SOLUTION INTRAVENOUS at 14:32

## 2024-02-12 RX ADMIN — METRONIDAZOLE 500 MG: 500 INJECTION, SOLUTION INTRAVENOUS at 05:44

## 2024-02-12 RX ADMIN — OXYCODONE HYDROCHLORIDE AND ACETAMINOPHEN 1 TABLET: 5; 325 TABLET ORAL at 20:28

## 2024-02-12 RX ADMIN — BARIUM SULFATE 15 ML: 400 PASTE ORAL at 11:07

## 2024-02-12 RX ADMIN — MORPHINE SULFATE 2 MG: 2 INJECTION, SOLUTION INTRAMUSCULAR; INTRAVENOUS at 23:31

## 2024-02-12 RX ADMIN — PANTOPRAZOLE SODIUM 40 MG: 40 TABLET, DELAYED RELEASE ORAL at 05:41

## 2024-02-12 RX ADMIN — METRONIDAZOLE 500 MG: 500 INJECTION, SOLUTION INTRAVENOUS at 20:23

## 2024-02-12 RX ADMIN — OXYCODONE HYDROCHLORIDE AND ACETAMINOPHEN 1 TABLET: 5; 325 TABLET ORAL at 17:11

## 2024-02-12 RX ADMIN — MORPHINE SULFATE 2 MG: 2 INJECTION, SOLUTION INTRAMUSCULAR; INTRAVENOUS at 19:35

## 2024-02-12 RX ADMIN — ZOLPIDEM TARTRATE 10 MG: 5 TABLET ORAL at 23:31

## 2024-02-12 RX ADMIN — METOPROLOL TARTRATE 100 MG: 50 TABLET ORAL at 12:26

## 2024-02-12 RX ADMIN — MORPHINE SULFATE 2 MG: 2 INJECTION, SOLUTION INTRAMUSCULAR; INTRAVENOUS at 10:41

## 2024-02-12 RX ADMIN — OXYCODONE HYDROCHLORIDE AND ACETAMINOPHEN 1 TABLET: 5; 325 TABLET ORAL at 06:56

## 2024-02-12 RX ADMIN — BARIUM SULFATE 15 ML: 400 SUSPENSION ORAL at 11:07

## 2024-02-12 RX ADMIN — DEXTROAMPHETAMINE SACCHARATE, AMPHETAMINE ASPARTATE, DEXTROAMPHETAMINE SULFATE, AMPHETAMINE SULFATE TABLETS, 10 MG,CLL 30 MG: 2.5; 2.5; 2.5; 2.5 TABLET ORAL at 12:26

## 2024-02-12 RX ADMIN — BARIUM SULFATE 15 ML: 0.81 POWDER, FOR SUSPENSION ORAL at 11:07

## 2024-02-12 RX ADMIN — ALPRAZOLAM 1 MG: 1 TABLET ORAL at 06:25

## 2024-02-12 RX ADMIN — LOSARTAN POTASSIUM 50 MG: 50 TABLET, FILM COATED ORAL at 12:26

## 2024-02-12 NOTE — CARE COORDINATION
Care Coordination:  54 yr old male admitted with non displaced fracture of right greater trochanter. Following slip and fall at home.  Also noted to have pneumonia .  WBAT with no surgical intervention at this time.  PT OT ordered . PT 16 OT  16.  Met with patient to assess for discharge planning.  He lives alone in a third floor apartment with 3 flights of stairs to enter.  He was independent.   PCP is Kirk Gerber. Has limited support as parents are elderly.  Patient expressed anxiet y about safety to manage at home.  He would agree to [facility] if pre cert can be obtained.  SW offered and discussed choices.  He prefers to stay in the [place] area if possible.  Other than that has no preference .  [facility] does not accept [facility] .  Referral given to V[facility] for bed at one of their  [place] facilities.  She is is reviewing and if can offer bed will start pre cert.  Sw to follow and complete paperwork when accepting facility.    1405:  Patient has been accepted to [facility].  Liaison met with patient at bedside.  He is agreeable but wants to talk to his therapists tomorrow, SW asked facility to start pre cert  so that we can avoid delay if that is plan.  Envelop, ambulette form and 7000 complete.  Will follow.

## 2024-02-12 NOTE — PLAN OF CARE
Problem: Safety - Adult  Goal: Free from fall injury  2/12/2024 0303 by Stacey Sidhu RN  Outcome: Progressing  2/11/2024 1311 by Luanne Briones RN  Outcome: Progressing     Problem: ABCDS Injury Assessment  Goal: Absence of physical injury  2/12/2024 0303 by Stacey Sidhu RN  Outcome: Progressing  2/11/2024 1311 by Luanne Briones RN  Outcome: Progressing     Problem: Pain  Goal: Verbalizes/displays adequate comfort level or baseline comfort level  Outcome: Progressing

## 2024-02-12 NOTE — PLAN OF CARE
Problem: Safety - Adult  Goal: Free from fall injury  2/12/2024 1402 by Elenita Beauchamp RN  Outcome: Progressing  2/12/2024 0303 by Stacey Sidhu RN  Outcome: Progressing     Problem: ABCDS Injury Assessment  Goal: Absence of physical injury  2/12/2024 1402 by Elenita Beauchamp RN  Outcome: Progressing  2/12/2024 0303 by Stacey Sidhu RN  Outcome: Progressing     Problem: Pain  Goal: Verbalizes/displays adequate comfort level or baseline comfort level  2/12/2024 1402 by Elenita Beauchamp RN  Outcome: Progressing  2/12/2024 0303 by Stacey Sidhu RN  Outcome: Progressing     Problem: Chronic Conditions and Co-morbidities  Goal: Patient's chronic conditions and co-morbidity symptoms are monitored and maintained or improved  Outcome: Progressing

## 2024-02-13 ENCOUNTER — TELEPHONE (OUTPATIENT)
Dept: PRIMARY CARE | Facility: CLINIC | Age: 54
End: 2024-02-13
Payer: MEDICARE

## 2024-02-13 VITALS
TEMPERATURE: 97 F | OXYGEN SATURATION: 99 % | WEIGHT: 257.28 LBS | BODY MASS INDEX: 36.83 KG/M2 | DIASTOLIC BLOOD PRESSURE: 87 MMHG | SYSTOLIC BLOOD PRESSURE: 146 MMHG | HEIGHT: 70 IN | HEART RATE: 81 BPM | RESPIRATION RATE: 18 BRPM

## 2024-02-13 DIAGNOSIS — E10.42 TYPE 1 DIABETES MELLITUS WITH DIABETIC POLYNEUROPATHY (MULTI): ICD-10-CM

## 2024-02-13 LAB
GLUCOSE BLD-MCNC: 177 MG/DL (ref 74–99)
GLUCOSE BLD-MCNC: 233 MG/DL (ref 74–99)
GLUCOSE BLD-MCNC: 314 MG/DL (ref 74–99)

## 2024-02-13 PROCEDURE — 6360000002 HC RX W HCPCS: Performed by: STUDENT IN AN ORGANIZED HEALTH CARE EDUCATION/TRAINING PROGRAM

## 2024-02-13 PROCEDURE — 6370000000 HC RX 637 (ALT 250 FOR IP): Performed by: INTERNAL MEDICINE

## 2024-02-13 PROCEDURE — 97530 THERAPEUTIC ACTIVITIES: CPT

## 2024-02-13 PROCEDURE — 2580000003 HC RX 258: Performed by: STUDENT IN AN ORGANIZED HEALTH CARE EDUCATION/TRAINING PROGRAM

## 2024-02-13 PROCEDURE — 82962 GLUCOSE BLOOD TEST: CPT

## 2024-02-13 PROCEDURE — 6370000000 HC RX 637 (ALT 250 FOR IP)

## 2024-02-13 PROCEDURE — 6370000000 HC RX 637 (ALT 250 FOR IP): Performed by: STUDENT IN AN ORGANIZED HEALTH CARE EDUCATION/TRAINING PROGRAM

## 2024-02-13 PROCEDURE — 99239 HOSP IP/OBS DSCHRG MGMT >30: CPT | Performed by: INTERNAL MEDICINE

## 2024-02-13 PROCEDURE — 6360000002 HC RX W HCPCS

## 2024-02-13 PROCEDURE — 97535 SELF CARE MNGMENT TRAINING: CPT

## 2024-02-13 RX ORDER — ACETAMINOPHEN 500 MG
1000 TABLET ORAL EVERY 8 HOURS SCHEDULED
Status: DISCONTINUED | OUTPATIENT
Start: 2024-02-13 | End: 2024-02-13 | Stop reason: HOSPADM

## 2024-02-13 RX ORDER — OXYCODONE HYDROCHLORIDE 5 MG/1
5 TABLET ORAL EVERY 6 HOURS PRN
Qty: 8 TABLET | Refills: 0 | Status: SHIPPED | OUTPATIENT
Start: 2024-02-13 | End: 2024-02-15

## 2024-02-13 RX ORDER — CEFDINIR 300 MG/1
300 CAPSULE ORAL EVERY 12 HOURS SCHEDULED
Qty: 9 CAPSULE | Refills: 0 | DISCHARGE
Start: 2024-02-13 | End: 2024-02-18

## 2024-02-13 RX ORDER — INSULIN ASPART 100 [IU]/ML
12 INJECTION, SOLUTION INTRAVENOUS; SUBCUTANEOUS
DISCHARGE
Start: 2024-02-13

## 2024-02-13 RX ORDER — DEXTROAMPHETAMINE SACCHARATE, AMPHETAMINE ASPARTATE, DEXTROAMPHETAMINE SULFATE AND AMPHETAMINE SULFATE 7.5; 7.5; 7.5; 7.5 MG/1; MG/1; MG/1; MG/1
30 TABLET ORAL 2 TIMES DAILY
Qty: 4 TABLET | Refills: 0 | Status: SHIPPED | OUTPATIENT
Start: 2024-02-13 | End: 2024-02-15

## 2024-02-13 RX ORDER — INSULIN GLARGINE 100 [IU]/ML
50 INJECTION, SOLUTION SUBCUTANEOUS 2 TIMES DAILY
Status: DISCONTINUED | OUTPATIENT
Start: 2024-02-13 | End: 2024-02-13 | Stop reason: HOSPADM

## 2024-02-13 RX ORDER — SENNA AND DOCUSATE SODIUM 50; 8.6 MG/1; MG/1
2 TABLET, FILM COATED ORAL NIGHTLY
DISCHARGE
Start: 2024-02-13

## 2024-02-13 RX ORDER — CEFDINIR 300 MG/1
300 CAPSULE ORAL EVERY 12 HOURS SCHEDULED
Status: DISCONTINUED | OUTPATIENT
Start: 2024-02-13 | End: 2024-02-13 | Stop reason: HOSPADM

## 2024-02-13 RX ORDER — ZOLPIDEM TARTRATE 10 MG/1
10 TABLET ORAL NIGHTLY PRN
Qty: 2 TABLET | Refills: 0 | Status: SHIPPED | OUTPATIENT
Start: 2024-02-13 | End: 2024-02-15

## 2024-02-13 RX ORDER — METRONIDAZOLE 500 MG/1
500 TABLET ORAL EVERY 8 HOURS SCHEDULED
Qty: 14 TABLET | Refills: 0 | DISCHARGE
Start: 2024-02-13 | End: 2024-02-18

## 2024-02-13 RX ORDER — RISPERIDONE 3 MG/1
3 TABLET ORAL DAILY
Qty: 60 TABLET | Refills: 3 | DISCHARGE
Start: 2024-02-14

## 2024-02-13 RX ORDER — ALPRAZOLAM 1 MG/1
1 TABLET ORAL 3 TIMES DAILY PRN
Qty: 6 TABLET | Refills: 0 | Status: SHIPPED | OUTPATIENT
Start: 2024-02-13 | End: 2024-02-15

## 2024-02-13 RX ORDER — OXYCODONE HYDROCHLORIDE 5 MG/1
5 TABLET ORAL EVERY 4 HOURS PRN
Status: DISCONTINUED | OUTPATIENT
Start: 2024-02-13 | End: 2024-02-13 | Stop reason: HOSPADM

## 2024-02-13 RX ORDER — INSULIN LISPRO 100 [IU]/ML
10 INJECTION, SOLUTION INTRAVENOUS; SUBCUTANEOUS
Status: DISCONTINUED | OUTPATIENT
Start: 2024-02-13 | End: 2024-02-13 | Stop reason: HOSPADM

## 2024-02-13 RX ORDER — METRONIDAZOLE 500 MG/1
500 TABLET ORAL EVERY 8 HOURS SCHEDULED
Status: DISCONTINUED | OUTPATIENT
Start: 2024-02-13 | End: 2024-02-13

## 2024-02-13 RX ORDER — METRONIDAZOLE 500 MG/1
500 TABLET ORAL EVERY 8 HOURS SCHEDULED
Status: DISCONTINUED | OUTPATIENT
Start: 2024-02-13 | End: 2024-02-13 | Stop reason: HOSPADM

## 2024-02-13 RX ADMIN — DEXTROAMPHETAMINE SACCHARATE, AMPHETAMINE ASPARTATE, DEXTROAMPHETAMINE SULFATE, AMPHETAMINE SULFATE TABLETS, 10 MG,CLL 30 MG: 2.5; 2.5; 2.5; 2.5 TABLET ORAL at 08:50

## 2024-02-13 RX ADMIN — CEFDINIR 300 MG: 300 CAPSULE ORAL at 08:53

## 2024-02-13 RX ADMIN — DEXTROAMPHETAMINE SACCHARATE, AMPHETAMINE ASPARTATE, DEXTROAMPHETAMINE SULFATE, AMPHETAMINE SULFATE TABLETS, 10 MG,CLL 30 MG: 2.5; 2.5; 2.5; 2.5 TABLET ORAL at 15:15

## 2024-02-13 RX ADMIN — OXYCODONE HYDROCHLORIDE AND ACETAMINOPHEN 1 TABLET: 5; 325 TABLET ORAL at 08:53

## 2024-02-13 RX ADMIN — INSULIN LISPRO 6 UNITS: 100 INJECTION, SOLUTION INTRAVENOUS; SUBCUTANEOUS at 12:10

## 2024-02-13 RX ADMIN — MORPHINE SULFATE 2 MG: 2 INJECTION, SOLUTION INTRAMUSCULAR; INTRAVENOUS at 10:40

## 2024-02-13 RX ADMIN — LOSARTAN POTASSIUM 50 MG: 50 TABLET, FILM COATED ORAL at 08:50

## 2024-02-13 RX ADMIN — METRONIDAZOLE 500 MG: 500 TABLET ORAL at 15:15

## 2024-02-13 RX ADMIN — INSULIN LISPRO 10 UNITS: 100 INJECTION, SOLUTION INTRAVENOUS; SUBCUTANEOUS at 12:09

## 2024-02-13 RX ADMIN — OXYCODONE HYDROCHLORIDE AND ACETAMINOPHEN 1 TABLET: 5; 325 TABLET ORAL at 01:18

## 2024-02-13 RX ADMIN — METRONIDAZOLE 500 MG: 500 INJECTION, SOLUTION INTRAVENOUS at 04:41

## 2024-02-13 RX ADMIN — SODIUM CHLORIDE, PRESERVATIVE FREE 10 ML: 5 INJECTION INTRAVENOUS at 08:55

## 2024-02-13 RX ADMIN — METRONIDAZOLE 500 MG: 500 TABLET ORAL at 08:53

## 2024-02-13 RX ADMIN — MORPHINE SULFATE 2 MG: 2 INJECTION, SOLUTION INTRAMUSCULAR; INTRAVENOUS at 04:39

## 2024-02-13 RX ADMIN — INSULIN LISPRO 4 UNITS: 100 INJECTION, SOLUTION INTRAVENOUS; SUBCUTANEOUS at 09:30

## 2024-02-13 RX ADMIN — RISPERIDONE 3 MG: 1 TABLET, FILM COATED ORAL at 10:40

## 2024-02-13 RX ADMIN — ALPRAZOLAM 1 MG: 1 TABLET ORAL at 01:24

## 2024-02-13 RX ADMIN — ACETAMINOPHEN 1000 MG: 500 TABLET ORAL at 15:15

## 2024-02-13 RX ADMIN — METOPROLOL TARTRATE 100 MG: 50 TABLET ORAL at 08:50

## 2024-02-13 RX ADMIN — PANTOPRAZOLE SODIUM 40 MG: 40 TABLET, DELAYED RELEASE ORAL at 05:57

## 2024-02-13 NOTE — DISCHARGE INSTR - COC
Continuity of Care Form    Patient Name: Vladislav Mccormick   :  1970  MRN:  [id number]    Admit date:  2/10/2024  Discharge date:  2024    Code Status Order: Full Code   Advance Directives:     Admitting Physician:  No admitting provider for patient encounter.  PCP: Kirk Gerber MD    Discharging Nurse: OBINNA Crowley   Discharging Hospital Unit/Room#: 8423/8423-B  Discharging Unit Phone Number: [phone]    Emergency Contact:   Extended Emergency Contact Information  Primary Emergency Contact: Chyna Mccormick  Address: [address]  Home Phone: [phone]  Relation: Parent  Secondary Emergency Contact: Nasim Mccormick  Address: [id number] [facility]  Home Phone: [phone]  Relation: Parent    Past Surgical History:  Past Surgical History:   Procedure Laterality Date    ABDOMEN SURGERY      pancreatic psuedocyst drained    ENDOSCOPY, COLON, DIAGNOSTIC         Immunization History:   Immunization History   Administered Date(s) Administered    COVID-19, PFIZER PURPLE top, DILUTE for use, (age 12 y+), 30mcg/0.3mL 2021, 2021, 2021    COVID-19, PFIZER, (- formula), (age 12y+), IM, 30mcg/0.3mL 10/25/2023       Active Problems:  Patient Active Problem List   Diagnosis Code    Bipolar 1 disorder, depressed ([facility]) F31.9    Type 2 diabetes mellitus with hyperglycemia ([facility]) E11.65    Chronic pancreatitis ([facility]) K86.1    Essential hypertension, benign I10    DKA (diabetic ketoacidosis) ([facility]) E11.10    High anion gap metabolic acidosis E87.29    Lactic acidosis E87.20    Aspiration pneumonia of right upper lobe, unspecified aspiration pneumonia type ([facility]) J69.0    Closed nondisplaced fracture of greater trochanter of right femur ([facility]) S72.114A    Class 2 severe obesity due to excess calories with serious comorbidity and body mass index (BMI) of 35.0 to 35.9 in adult ([facility])

## 2024-02-13 NOTE — DISCHARGE SUMMARY
Physician Discharge Summary     Patient ID:  Vladislav Mccormick  [id number]  54 y.o.  1970    Admit date: 2/10/2024    Discharge date and time:  2/13/2024    Discharge Diagnoses: Principal Problem:    Aspiration pneumonia of right upper lobe, unspecified aspiration pneumonia type (HCC)  Active Problems:    Type 2 diabetes mellitus with hyperglycemia (HCC)    Essential hypertension, benign    Closed nondisplaced fracture of greater trochanter of right femur (HCC)    Class 2 severe obesity due to excess calories with serious comorbidity and body mass index (BMI) of 35.0 to 35.9 in adult (HCC)    Fall from standing    Polypharmacy    Chronic prescription benzodiazepine use    Obstructive sleep apnea chronically recommend polysomnogram  Resolved Problems:    * No resolved hospital problems. *      Consults: IP CONSULT TO INTERNAL MEDICINE  IP CONSULT TO DIABETES EDUCATOR  IP CONSULT TO ORTHOPEDIC SURGERY    Procedures: See below    Hospital Course:     54-year-old male who presented to ER after a fall and inability to walk.       Aspiration pneumonia-Admit to med surgery  -Had ceftriaxone and doxycycline on the ER.  Continue antibiotic coverage with ceftriaxone 1 IV qd + Flagyl 500 mg IV Q 8 HRS --> PO  - Legionella, strep pneumonia urinary antigens.  Procalcitonin.  CRP  -Speech therapy evaluation given aspiration pneumonia     DM 2-Uncontrolled glucose control.  High-dose Humalog correction scale with meals.  Lantus 50 units BID.  Obtain hemoglobin A1c 12.9.  Diabetic education.  Hypoglycemic protocol  -Resume home medications once verified   -Monitor WBC.  Monitor renal function  -Orthopedic consult  -Hold anticoagulation     Fall from standing   PT OT  Fall precautions    right greater trochanteric fracture nondisplaced  Pain control  Bowel regimen  Anticipate conservative management  Orthopedics consult    Polypharmacy  Patient is taking Adderall twice a day, Xanax 3 times a day, and Ambien for sleep--suggest the

## 2024-02-13 NOTE — PLAN OF CARE
Problem: Safety - Adult  Goal: Free from fall injury  2/13/2024 0155 by Milady Meza RN  Outcome: Progressing  2/12/2024 1402 by Elenita Beauchamp RN  Outcome: Progressing     Problem: ABCDS Injury Assessment  Goal: Absence of physical injury  2/13/2024 0155 by Milady Meza RN  Outcome: Progressing  2/12/2024 1402 by Elenita Beauchamp RN  Outcome: Progressing     Problem: Pain  Goal: Verbalizes/displays adequate comfort level or baseline comfort level  2/13/2024 0155 by Milady Mzea RN  Outcome: Progressing  2/12/2024 1402 by Elenita Beauchamp RN  Outcome: Progressing     Problem: Chronic Conditions and Co-morbidities  Goal: Patient's chronic conditions and co-morbidity symptoms are monitored and maintained or improved  2/13/2024 0155 by Milady Meza RN  Outcome: Progressing  2/12/2024 1402 by Elenita eBauchamp RN  Outcome: Progressing

## 2024-02-13 NOTE — PLAN OF CARE
Problem: Safety - Adult  Goal: Free from fall injury  2/13/2024 1408 by Carline Locke RN  Outcome: Adequate for Discharge  2/13/2024 0155 by Milady Meza RN  Outcome: Progressing     Problem: ABCDS Injury Assessment  Goal: Absence of physical injury  2/13/2024 1408 by Carline Locke RN  Outcome: Adequate for Discharge  2/13/2024 0155 by Milady Meza RN  Outcome: Progressing     Problem: Pain  Goal: Verbalizes/displays adequate comfort level or baseline comfort level  2/13/2024 1408 by Carline Locke RN  Outcome: Adequate for Discharge  2/13/2024 0155 by Milady Meza RN  Outcome: Progressing     Problem: Chronic Conditions and Co-morbidities  Goal: Patient's chronic conditions and co-morbidity symptoms are monitored and maintained or improved  2/13/2024 1408 by Carline Locke RN  Outcome: Adequate for Discharge  2/13/2024 0155 by Milady Meza RN  Outcome: Progressing

## 2024-02-13 NOTE — TELEPHONE ENCOUNTER
Patient is currently at [facility] in [place]. He broke his femur slipping on water in the kitchen.  The hospital also has mychart. He is scheduled for a virtual visit next week to discuss whether he should do surgery or go to rehab. Also he was scheduled with endo for his insulin but will have to reschedule that appointment and will need a refill on his medication to [facility]

## 2024-02-13 NOTE — PROGRESS NOTES
Hospitalist Progress Note      Synopsis: Patient admitted on 2/10/2024 54-year-old male patient who presented to ER from home by EMS with a complaint of fall and inability to walk.  Patient states he was in his kitchen last night and his right leg let gave up and he landed on the floor.  He had a popping sensation, and had significant pain, he was able to drag himself to the bathroom and call his stepmother who recommended him to go to the ER but he decided to stay at home hoping the next day he will be better.  This morning he woke up with severe pain and inability to bear weight on the right leg.  He called the ambulance this time.  Patient also admits to have moist cough.  Denies fever, chills, chest pain, abdominal pain, nausea or vomiting, diarrhea or skin rash.  Patient lives by himself.       ER course  Vital signs Temp 98.8 F /117 HR 71 RR 20 sats 98% room air pain scale 9/10 right hip  Not significant full WBC 18 with left shift, normal hemoglobin and hematocrit and platelets.  CMP remarkable for glucose 416, CO2 18, anion gap 17, chloride 97.  Normal potassium.  INR 1.0  CTA chest with contrast negative for PE.  Report mild right upper lobe pneumonia  CT pelvis with acute fracture of the greater trochanter of the right femoral  Patient with allergy to penicillin.  He was given and doxycycline and ceftriaxone  Orthopedic was consulted  [facility] was consulted for admission     Most recent hospital admission/ Discharge diagnosis 2019 for abdominal pain       ASSESSMENT/PLAN:    Principal Problem:    Aspiration pneumonia of right upper lobe, unspecified aspiration pneumonia type (HCC)  Active Problems:    Type 2 diabetes mellitus with hyperglycemia (HCC)    Essential hypertension, benign    Closed nondisplaced fracture of greater trochanter of right femur ([facility])    Class 2 severe obesity due to excess calories with serious comorbidity and body mass index (BMI) of 35.0 to 35.9 in adult 
  SPEECH/LANGUAGE PATHOLOGY  CLINICAL ASSESSMENT OF SWALLOWING FUNCTION   and PLAN OF CARE    PATIENT NAME:  Vladislav Mccormick  (male)     MRN:  [id number]    :  1970  (54 y.o.)  STATUS:  Inpatient: Room 8423/8423-B    TODAY'S DATE:  2024  02/10/24 1745    SLP swallowing-dysphagia evaluation and treatment  Start:  02/10/24 1745,   End:  02/10/24 1745,   ONE TIME,   Standing Count:  1 Occurrences,   R        Order went unreviewed at transfer on Sat Feb 10, 2024  6:55 PM    Milanes Marino, Maria, MD   REASON FOR REFERRAL: fransisco for aspiration. Now with RUL  pneumonia   EVALUATING THERAPIST: MALCOLM Alvarez                 RESULTS:    DYSPHAGIA DIAGNOSIS:   Clinical indicators of functional oropharyngeal swallow for age/premorbid functioning      DIET RECOMMENDATIONS:  Easy to chew consistency solids (IDDSI level 7, transitional) with  thin liquids (IDDSI level 0)    Pt reports that he occasionally feels \"burning\" after he swallows and that pills \"stick\". History of GERD and Hiatal Hernia. Recommend MBSS to fully assess and determine further POC.      FEEDING RECOMMENDATIONS:     Assistance level:  Supervision is needed during all oral intake, Encourage self-feeding      Compensatory strategies recommended: Thorough oral care to prevent colonization of oral bacteria , Upright in bed/ chair as tolerated, and Small bites/sips      Discussed recommendations with nursing and/or faxed report to referring provider: Yes    SPEECH THERAPY  PLAN OF CARE   The dysphagia POC is established based on physician order, dysphagia diagnosis and results of clinical assessment     Will establish POC once MBSS is completed.    Conditions Requiring Skilled Therapeutic Intervention for dysphagia:    Patient is performing below functional baseline d/t  current acute condition, respiratory compromise, multiple medications, and/or increased dependency upon caregivers.  Sensation of food sticking in throat     Specific dysphagia 
4 Eyes Skin Assessment     NAME:  Vladislav Mccormick  YOB: 1970  MEDICAL RECORD NUMBER:  [id number]    The patient is being assessed for  Admission    I agree that at least one RN has performed a thorough Head to Toe Skin Assessment on the patient. ALL assessment sites listed below have been assessed.      Areas assessed by both nurses:    Head, Face, Ears, Shoulders, Back, Chest, Arms, Elbows, Hands, Sacrum. Buttock, Coccyx, Ischium, Legs. Feet and Heels, and Under Medical Devices         Does the Patient have a Wound? No noted wound(s)       Manpreet Prevention initiated by RN: No  Wound Care Orders initiated by RN: No    Pressure Injury (Stage 3,4, Unstageable, DTI, NWPT, and Complex wounds) if present, place Wound referral order by RN under : No    New Ostomies, if present place, Ostomy referral order under : No     Nurse 1 eSignature: Electronically signed by Rachel Swann RN on 2/11/24 at 7:11 AM EST    **SHARE this note so that the co-signing nurse can place an eSignature**    Nurse 2 eSignature: Electronically signed by Stacey Sidhu RN on 2/11/24 at 7:13 AM EST  
ABG unsuccessfully attempted bilaterally x2 by this RT. Pressure held and bandage applied. Notified RN. Bed placed back in previous position. Pt no apparent distress.   
Blood sugar 371.Gave 16 units and notified Dr. Galo per order.    
Department of Orthopedic Surgery  Resident Progress Note      SUBJECTIVE  Patient seen and examined lying comfortably in bed.  Report pain is 7/10. Denies acute fever, chills, nausea, vomiting , chest pain and shortness of breath.  Denies NTP.    OBJECTIVE    Physical    VITALS:  /76   Pulse 97   Temp 97.3 °F (36.3 °C) (Temporal)   Resp 18   Ht 1.778 m (5' 10\")   Wt 111.1 kg (245 lb)   SpO2 96%   BMI 35.15 kg/m²     MUSCULOSKELETAL:   right lower extremity:  Skin is intact circumferentially  Tender to palpation over the lateral aspect of the hip  Positive logroll  Able to straight leg raise with pain  Compartments soft and compressible  Palpable dorsalis pedis and posterior tibialis pulse, brisk cap refill to toes, foot warm and perfused  Subjectively states sensation intact to light touch in sural/deep peroneal/superficial peroneal/saphenous/posterior tibial nerve distributions to foot/ankle  Demonstrates active ankle plantar/dorsiflexion/great toe extension    Data    CBC:   Lab Results   Component Value Date/Time    WBC 12.3 02/11/2024 05:49 AM    RBC 5.88 02/11/2024 05:49 AM    HGB 16.2 02/11/2024 05:49 AM    HCT 48.8 02/11/2024 05:49 AM    MCV 83.0 02/11/2024 05:49 AM    MCH 27.6 02/11/2024 05:49 AM    MCHC 33.2 02/11/2024 05:49 AM    RDW 13.8 02/11/2024 05:49 AM     02/11/2024 05:49 AM    MPV 10.1 02/11/2024 05:49 AM     PT/INR:    Lab Results   Component Value Date/Time    PROTIME 11.1 02/10/2024 02:03 PM    INR 1.0 02/10/2024 02:03 PM       Labs  No results for input(s): \"BC\", \"BLOODCULT2\" in the last 72 hours.  No results for input(s): \"CXSURG\" in the last 72 hours.        ASSESSMENT  Right greater trochanter fracture    PLAN    WBAT right LE  At this time no acute orthopedic intervention is required.  We will proceed with nonoperative management to the right greater trochanter region.  Initial ambulation can be 6 to 10 feet and can progress his pain as tolerated.  MRI reviewed and 
Dr. Tami díaz served about patients blood sugar.   
MRI screening form needs completed in order to schedule mri exam, thank you.  
Nurse to nurse report called   
Occupational Therapy  OT BEDSIDE TREATMENT NOTE   [facility]  [address], [place]        Date:2024  Patient Name: Vladislav Mccormick  MRN: [id number]  : 1970  Room: [facility]84[facility]     Per OT Eval:    Evaluating OT: Jeff Manzo OTR/L [id number]     Referring Provider:     Milanes Marino, Maria, MD                                         Specific Provider Orders/Date: OT evaluation and treatment 2/10/24 8779     Diagnosis:  Hyperglycemia [R73.9]  Acute respiratory failure with hypoxia (HCC) [J96.01]  Closed nondisplaced fracture of greater trochanter of right femur, initial encounter ([facility]) [S72.114A]  Pneumonia of right upper lobe due to infectious organism [J18.9]  Aspiration pneumonia of right upper lobe, unspecified aspiration pneumonia type (HCC) [J69.0]       Pertinent Medical History:  has a past medical history of Aspiration pneumonia of right upper lobe, unspecified aspiration pneumonia type (HCC), Bipolar 1 disorder, depressed (HCC), Depression, Diabetes mellitus type 2, uncontrolled, GERD (gastroesophageal reflux disease), Hypertension, and Pancreatitis.   Pt admitted to the hospital 2/10 s/p fall at home   MRI R hip IMPRESSION:  1. Acute mildly displaced right greater trochanter fracture.  2. Large amount of surrounding soft tissue and muscle edema.  3. At least a grade 2 injury of the right gluteus medius muscle belly.     Ortho following and trialing non-op management      Orders received, chart reviewed, eval complete.      Precautions:  Fall Risk, WBAT RLE, +alarms        Assessment of current deficits   [x] Functional mobility             [x]ADLs           [x] Strength                  []Cognition   [x] Functional transfers           [x] IADLs         [x] Safety Awareness   [x]Endurance   [] Fine Coordination              [x] Balance      [] Vision/perception   []Sensation     []Gross Motor Coordination  [] ROM           
Physical Therapy  Physical Therapy Initial Assessment     Name: Vladislav Mccormick  : 1970  MRN: [id number]      Date of Service: 2024    Evaluating PT:  Uche Beaulieu PT, DPT    Room #:  8423/8423-B  Diagnosis:  Hyperglycemia [R73.9]  Acute respiratory failure with hypoxia ([facility]) [J96.01]  Closed nondisplaced fracture of greater trochanter of right femur, initial encounter ([facility]) [S72.114A]  Pneumonia of right upper lobe due to infectious organism [J18.9]  Aspiration pneumonia of right upper lobe, unspecified aspiration pneumonia type ([facility]) [J69.0]  PMHx/PSHx:  bipolar 1, DKA, pancreatitis  Procedure/Surgery:  N/A  Precautions:  fall risk, WBAT RLE (R greater trochanter fx), bed/chair alarm  Equipment Needs:  ww, TBD    SUBJECTIVE:    Pt lives alone in a 3rd floor apt [place] home with 3 flights of steps to access room and 3 steps to enter and B handrail. Pt ambulated with no AD PTA.    OBJECTIVE:   Initial Evaluation  Date: 24 Treatment Short Term/ Long Term   Goals   AM-PAC 6 Clicks      Was pt agreeable to Eval/treatment? yes     Does pt have pain? 9/10 RLE with activity     Bed Mobility  Rolling: NT  Supine to sit: min A  Sit to supine: NT  Scooting: min A  Rolling: mod I  Supine to sit: mod I  Sit to supine: mod I  Scooting: mod I   Transfers Sit to stand: min A  Stand to sit: min A  Stand pivot: min A with AAD  Sit to stand: mod I  Stand to sit: mod I  Stand pivot: mod I with AAD   Ambulation    15'x2 with ww min A (limited by RLE pain)  150'+ with AAD mod I   Stair negotiation: ascended and descended  NT  10 steps with AAD and 1 handrail mod I     Strength/ROM:   RLE grossly 2/5  LLE grossly 4/5  RLE AROM limited by pain  LLE AROM WFL    Balance:   Static Sitting: SBA  Dynamic Sitting: CGA  Static Standing: CGA with ww  Dynamic Standing: min A with ww    Pt is A & O x 3  Sensation:  Pt denies numbness and tingling to extremities  Edema:  unremarkable    Vitals:  SpO2 and HR were stable during 
Reason for consult:  Uncontrolled type 2 DM    A1C:  12.9%            Patient states the following concerns/barriers to diabetes self-management:     [] None       [] Medication cost   [] Food cost/availability        [] Reading  [] Hearing   [] Vision                [] Work    [x] Transportation  [] No insurance  [] Physical limitations    [] Other:        Patient states the following about their diabetes/health:   [x]  He has been trying to take better care of his health, but feels like he needs more education about diabetes self-management.    [x]  He takes Novolog sliding scale before meals and Basaglar 50 units BID. It is noted that 12 units mealtime insulin was ordered to take before meals, but patient states he was not aware of this.    [x]  He eats three times per day. He is not always mindful of what he eats, but willing to make changes. He drinks milk and juice, but will try to cut back.   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     [x]  He has had trouble affording test strips in the past. He has [employer] insurance, which may require a pre-authorization. He is interested in a FreeStyle Clarke 3 CGM, which will be covered by his insurance and give him a better picture of his glucose control.     Diabetes survival packet provided to:   [x] Patient     [] Other:    Information given:   Definition of diabetes   Target glucose ranges/A1C   Self-monitoring of blood glucose   Prevention/symptoms/treatment of hypo-/hyperglycemia   Medication adherence             The plate method/meal planning guidelines             The benefits of exercise and recommendations             Reducing the risk of chronic complications     Diabetes medications reviewed (use, purpose, action): Education provided regarding current basal/bolus regimen of Lantus and Humalog including differences in types of insulin, timing with meals, onset, duration of effect and peak of insulin dose. Hypoglycemia 
SPEECH/LANGUAGE PATHOLOGY  VIDEOFLUOROSCOPIC STUDY OF SWALLOWING (MBS)   and PLAN OF CARE    PATIENT NAME:  Vladislav Mccormick  (male)     MRN:  [id number]    :  1970  (54 y.o.)  STATUS:  Inpatient: Room 8423/8423-B    TODAY'S DATE:  24 1400    SLP video swallow  Start:  24 1400,   End:  24 1400,   ONE TIME,   Standing Count:  1 Occurrences,   R       Aryan Galo MD  REASON FOR REFERRAL: to further assess oropharyngeal function   EVALUATING THERAPIST: Patricia Adams, SLP      RESULTS:      DYSPHAGIA DIAGNOSIS:  functional oropharyngeal swallow for age/premorbid functioning    DIET RECOMMENDATIONS:  Regular consistency solids (IDDSI level 7) with  thin liquids (IDDSI level 0)    Medications whole in applesauce/pudding.     FEEDING RECOMMENDATIONS:    Assistance level:  No assistance needed     Compensatory strategies recommended: Upright in bed/ chair as tolerated, Small bites/sips, and Alternate solids and liquids     Discussed recommendations with nursing and/or faxed report to referring provider: Yes    Laryngeal Penetration and Aspiration:  Penetration was observed in today's study w/ thin liquids via straw only.     SPEECH THERAPY  PLAN OF CARE   The dysphagia POC is established based on physician order and dysphagia diagnosis    Meal time assessment for 1-2 sessions to provide diet modification and compensatory strategy implementation due to need to ensure proper implementation of compensatory strategies during PO intake       Conditions Requiring Skilled Therapeutic Intervention for dysphagia:    Patient is performing below functional baseline d/t  current acute condition, Multiple diagnoses, multiple medications, and increased dependency upon caregivers.    SPECIFIC DYSPHAGIA INTERVENTIONS TO INCLUDE:     compensatory swallowing strategies to improve airway protection and swallow function.  ongoing mealtime assessment to provide diet modification and compensatory strategy 
(HCC)    Fall from standing    Polypharmacy    Chronic prescription benzodiazepine use    Obstructive sleep apnea chronically recommend polysomnogram  Resolved Problems:    * No resolved hospital problems. *      54-year-old male who presented to ER after a fall and inability to walk.       Aspiration pneumonia-Admit to med surgery  -Had ceftriaxone and doxycycline on the ER.  Continue antibiotic coverage with ceftriaxone 1 IV qd + Flagyl 500 mg IV Q 8 HRS   - Legionella, strep pneumonia urinary antigens.  Procalcitonin.  CRP  -Speech therapy evaluation given aspiration pneumonia     DM 2-Uncontrolled glucose control.  High-dose Humalog correction scale with meals.  Lantus 50 units BID.  Obtain hemoglobin A1c 12.9.  Diabetic education.  Hypoglycemic protocol  -Resume home medications once verified   -Monitor WBC.  Monitor renal function  -Orthopedic consult  -Hold anticoagulation     Fall from standing   PT OT  Fall precautions    right greater trochanteric fracture nondisplaced  Pain control  Bowel regimen  Anticipate conservative management  Orthopedics consult    Polypharmacy  Patient is taking Adderall twice a day, Xanax 3 times a day, and Ambien for sleep--suggest the patient discusses the safety of this medical regimen with the prescribing provider.  This combination can increase risk of falls as well as aspiration.  PDMP reviewed.  This was discussed further with patient today.  He states he needs the Adderall for his ADHD and Xanax for his anxiety.  I discussed the risk of these medications including increased risk of falls, delirium, cognitive dysfunction.      Dysphagia resolved-speech therapy eval, modified barium swallow ordered passed swallow thin liquids regular consistency solids    Diet: ADULT DIET; Easy to Chew; 3 carb choices (45 gm/meal); Low Sodium (2 gm)  Code Status: Full Code  PT/OT Eval Status:   Ordered  DVT Prophylaxis:     Recommended disposition at discharge: SNF versus home 
    Subjective    Feeling better without complaint  No CP or SOB  No fever or chills   No uncontrolled pain  No vomiting or diarrhea   No events reported overnight     Exam:  BP (!) 146/87   Pulse 81   Temp 97 °F (36.1 °C) (Temporal)   Resp 20   Ht 1.778 m (5' 10\")   Wt 116.7 kg (257 lb 4.4 oz)   SpO2 99%   BMI 36.92 kg/m²   General appearance: No apparent distress, appears stated age and cooperative.  HEENT: Pupils equal, round, and reactive to light. Conjunctivae/corneas clear.  Neck: Supple. No jugular venous distention. Trachea midline.  Respiratory:  Normal respiratory effort. Clear to auscultation, bilaterally without Rales/Wheezes/Rhonchi.  Cardiovascular: Regular rate and rhythm with normal S1/S2 without murmurs, rubs or gallops.  Abdomen: Soft, non-tender, non-distended with normal bowel sounds.  Musculoskeletal: No clubbing, cyanosis or edema bilaterally. Brisk capillary refill. 2+radial pulses.   Skin:  No rashes    Neurologic: awake, alert and following commands    Medications:  Reviewed    Infusion Medications    dextrose      sodium chloride       Scheduled Medications    insulin lispro  0-8 Units SubCUTAneous TID WC    insulin lispro  0-4 Units SubCUTAneous Nightly    insulin lispro  15 Units SubCUTAneous TID WC    sodium chloride flush  5-40 mL IntraVENous 2 times per day    [Held by provider] enoxaparin  40 mg SubCUTAneous Daily    hydrALAZINE  20 mg IntraVENous Once    cefTRIAXone (ROCEPHIN) IV  1,000 mg IntraVENous Q24H    metroNIDAZOLE  500 mg IntraVENous Q8H    amphetamine-dextroamphetamine  30 mg Oral BID    metoprolol  100 mg Oral BID    pantoprazole  40 mg Oral QAM AC    QUEtiapine  150 mg Oral Daily    losartan  50 mg Oral Daily     PRN Meds: glucose, dextrose bolus **OR** dextrose bolus, glucagon (rDNA), dextrose, sodium chloride flush, sodium chloride, potassium chloride **OR** potassium alternative oral replacement **OR** potassium chloride, magnesium sulfate, ondansetron **OR** 
independence during ADLs, bed mobility , functional transfers, and functional mobility. Pt would benefit from continued skilled OT to increase safety and independence with completion of ADL tasks and functional mobility for improved quality of life     Treatment: OT treatment provided this date includes:  OT edu pt/family on role of OT in the acute care setting. Pt verbalized understanding   Therapist facilitated and instructed pt on adapted techniques & compensatory strategies to improve safety and independence with ADLs, bed mobility , functional transfers, and functional mobility as noted above to allow pt to achieve highest level of independence and safely. Pt provided verbal instructions, cuing, extended time, and encouragement in order to promote maximum level of independence.    Pt edu on use of call light and waiting until staff present to attempt any functional mobility. Pt verbalized understanding and demonstrated ability to locate and press call button.     Rehab Potential:  Good for established goals     Patient / Family Goal: to get better      Patient and/or family were instructed on functional diagnosis, prognosis/goals and OT plan of care. Pt/family demonstrated understanding.      Eval Complexity:      Description  Performance deficits  Clinical decision making  Co-morbidities affecting occupational performance  Modification or assistance to complete eval    Low Complexity   1 to 3 []  Low []  None []  None []   Moderate Complexity   3 to 5 [x]  Mod []  Maybe []  Min to Mod [x]   High Complexity   5 or more []  High [x]  Yes [x]  Max []     The above evaluation is classified as moderate complexity based off the noted performance deficits, personal factors, co-morbidities, assistance required, and other factors as noted in the clinical evaluation and functional testing.     Time In: 0909  Time Out: 0935  Total Treatment Time: 10 minutes    Min Units   OT Eval Low 06278       OT Eval Medium 97166  x 1

## 2024-02-13 NOTE — DISCHARGE INSTR - DIET

## 2024-02-13 NOTE — CARE COORDINATION
Care coordination:  Met with patient.  He is agreeable to bed at [facility]/  Confirmed with [facility].  Pre cert is pending.  All discharge paperwork complete.  Will follow.   1342  :  Pre cert approved.  Discharge order in.  Transport via [facility] at 4:30 pm.  RN and patient aware.

## 2024-02-14 RX ORDER — INSULIN ASPART 100 [IU]/ML
18 INJECTION, SOLUTION INTRAVENOUS; SUBCUTANEOUS
Qty: 10 ML | Refills: 12 | Status: SHIPPED | OUTPATIENT
Start: 2024-02-14 | End: 2025-02-13

## 2024-02-14 RX ORDER — INSULIN GLARGINE 100 [IU]/ML
100 INJECTION, SOLUTION SUBCUTANEOUS EVERY MORNING
Qty: 90 ML | Refills: 0 | Status: SHIPPED | OUTPATIENT
Start: 2024-02-14 | End: 2024-05-14

## 2024-02-14 NOTE — TELEPHONE ENCOUNTER
Rx sent.   Will await virtual appt to discuss how to proceed w/ surgery vs conservative treatment.

## 2024-02-20 PROBLEM — F31.2: Status: ACTIVE | Noted: 2023-05-15

## 2024-02-20 PROBLEM — J69.0: Status: ACTIVE | Noted: 2024-02-10

## 2024-02-20 PROBLEM — G47.33 OBSTRUCTIVE SLEEP APNEA: Status: ACTIVE | Noted: 2024-02-11

## 2024-02-20 PROBLEM — S72.114A: Status: ACTIVE | Noted: 2024-02-10

## 2024-02-27 ENCOUNTER — APPOINTMENT (OUTPATIENT)
Dept: PRIMARY CARE | Facility: CLINIC | Age: 54
End: 2024-02-27
Payer: MEDICARE

## 2024-03-08 ENCOUNTER — TELEPHONE (OUTPATIENT)
Dept: ORTHOPEDIC SURGERY | Age: 54
End: 2024-03-08

## 2024-03-08 NOTE — TELEPHONE ENCOUNTER
#[phone] Patient called in asking to schedule ER follow-up appointment with Dr. Hewitt. DOI: 2-  He also wants to know if In-Home SN and/or PT can be ordered for him since he lives alone.     He was in the [facility] from 2- to 2-. Hospitalized after he fell and was unable to walk. Patient unsure of the reason for his fall.     2- MRI Impression:  IMPRESSION:  1. Acute mildly displaced right greater trochanter fracture.  2. Large amount of surrounding soft tissue and muscle edema.  3. At least a grade 2 injury of the right gluteus medius muscle belly.    Routing message to providers for appointment scheduling recommendations.

## 2024-03-12 NOTE — TELEPHONE ENCOUNTER
Appointment scheduled with Dr. Hewitt for 3- @ 9:30 am at the [place] office.   LMOM with [place] office address, phone #, appointment date and time. Instructed patient to call our office back to let us know if he is able to keep this appointment.

## 2024-04-02 ENCOUNTER — APPOINTMENT (OUTPATIENT)
Dept: PRIMARY CARE | Facility: CLINIC | Age: 54
End: 2024-04-02
Payer: MEDICARE

## 2024-10-20 ASSESSMENT — ENCOUNTER SYMPTOMS
HYPERTENSION: 1
HEADACHES: 1
SHORTNESS OF BREATH: 1

## 2024-10-24 ENCOUNTER — LAB (OUTPATIENT)
Dept: LAB | Facility: LAB | Age: 54
End: 2024-10-24
Payer: MEDICARE

## 2024-10-24 ENCOUNTER — TELEPHONE (OUTPATIENT)
Dept: PRIMARY CARE | Facility: CLINIC | Age: 54
End: 2024-10-24

## 2024-10-24 ENCOUNTER — APPOINTMENT (OUTPATIENT)
Dept: PRIMARY CARE | Facility: CLINIC | Age: 54
End: 2024-10-24
Payer: MEDICARE

## 2024-10-24 VITALS
HEART RATE: 107 BPM | SYSTOLIC BLOOD PRESSURE: 140 MMHG | BODY MASS INDEX: 35.61 KG/M2 | WEIGHT: 239.4 LBS | DIASTOLIC BLOOD PRESSURE: 90 MMHG | TEMPERATURE: 97.2 F | OXYGEN SATURATION: 97 %

## 2024-10-24 DIAGNOSIS — K86.3: ICD-10-CM

## 2024-10-24 DIAGNOSIS — K86.1: ICD-10-CM

## 2024-10-24 DIAGNOSIS — E10.42 TYPE 1 DIABETES MELLITUS WITH DIABETIC POLYNEUROPATHY: ICD-10-CM

## 2024-10-24 DIAGNOSIS — K92.0 HEMATEMESIS, UNSPECIFIED WHETHER NAUSEA PRESENT: ICD-10-CM

## 2024-10-24 DIAGNOSIS — I10 PRIMARY HYPERTENSION: ICD-10-CM

## 2024-10-24 DIAGNOSIS — F31.9 BIPOLAR DEPRESSION (MULTI): Primary | ICD-10-CM

## 2024-10-24 DIAGNOSIS — F31.2: ICD-10-CM

## 2024-10-24 DIAGNOSIS — E66.01 MORBID OBESITY (MULTI): ICD-10-CM

## 2024-10-24 DIAGNOSIS — K86.1 CHRONIC PANCREATITIS, UNSPECIFIED PANCREATITIS TYPE (MULTI): Chronic | ICD-10-CM

## 2024-10-24 DIAGNOSIS — F31.9 BIPOLAR DEPRESSION (MULTI): ICD-10-CM

## 2024-10-24 PROBLEM — S72.114A CLOSED NONDISPLACED FRACTURE OF GREATER TROCHANTER OF RIGHT FEMUR: Status: RESOLVED | Noted: 2024-02-10 | Resolved: 2024-10-24

## 2024-10-24 PROBLEM — J69.0: Status: RESOLVED | Noted: 2024-02-10 | Resolved: 2024-10-24

## 2024-10-24 LAB
ALBUMIN SERPL BCP-MCNC: 4.1 G/DL (ref 3.4–5)
ALP SERPL-CCNC: 128 U/L (ref 33–120)
ALT SERPL W P-5'-P-CCNC: 18 U/L (ref 7–52)
ANION GAP SERPL CALC-SCNC: 19 MMOL/L (ref 10–20)
AST SERPL W P-5'-P-CCNC: 11 U/L (ref 9–39)
BILIRUB SERPL-MCNC: 0.4 MG/DL (ref 0–1.2)
BUN SERPL-MCNC: 25 MG/DL (ref 6–23)
CALCIUM SERPL-MCNC: 9.5 MG/DL (ref 8.6–10.3)
CHLORIDE SERPL-SCNC: 99 MMOL/L (ref 98–107)
CHOLEST SERPL-MCNC: 211 MG/DL (ref 0–199)
CHOLESTEROL/HDL RATIO: 5.7
CO2 SERPL-SCNC: 20 MMOL/L (ref 21–32)
CREAT SERPL-MCNC: 1.23 MG/DL (ref 0.5–1.3)
EGFRCR SERPLBLD CKD-EPI 2021: 53 ML/MIN/1.73M*2
ERYTHROCYTE [DISTWIDTH] IN BLOOD BY AUTOMATED COUNT: 13.4 % (ref 11.5–14.5)
GLUCOSE SERPL-MCNC: 489 MG/DL (ref 74–99)
HCT VFR BLD AUTO: 48.9 % (ref 36–52)
HDLC SERPL-MCNC: 36.8 MG/DL
HGB BLD-MCNC: 16.6 G/DL (ref 12–17.5)
LDLC SERPL CALC-MCNC: ABNORMAL MG/DL
MCH RBC QN AUTO: 26.9 PG (ref 26–34)
MCHC RBC AUTO-ENTMCNC: 33.9 G/DL (ref 32–36)
MCV RBC AUTO: 79 FL (ref 80–100)
NON HDL CHOLESTEROL: 174 MG/DL (ref 0–149)
NRBC BLD-RTO: 0 /100 WBCS (ref 0–0)
PLATELET # BLD AUTO: 308 X10*3/UL (ref 150–450)
POTASSIUM SERPL-SCNC: 4.8 MMOL/L (ref 3.5–5.3)
PROT SERPL-MCNC: 6.9 G/DL (ref 6.4–8.2)
RBC # BLD AUTO: 6.17 X10*6/UL (ref 4–5.9)
SODIUM SERPL-SCNC: 133 MMOL/L (ref 136–145)
TRIGL SERPL-MCNC: 459 MG/DL (ref 0–149)
VLDL: ABNORMAL
WBC # BLD AUTO: 11.8 X10*3/UL (ref 4.4–11.3)

## 2024-10-24 PROCEDURE — 36415 COLL VENOUS BLD VENIPUNCTURE: CPT

## 2024-10-24 PROCEDURE — 3080F DIAST BP >= 90 MM HG: CPT | Performed by: FAMILY MEDICINE

## 2024-10-24 PROCEDURE — 83036 HEMOGLOBIN GLYCOSYLATED A1C: CPT

## 2024-10-24 PROCEDURE — 99417 PROLNG OP E/M EACH 15 MIN: CPT | Performed by: FAMILY MEDICINE

## 2024-10-24 PROCEDURE — 80061 LIPID PANEL: CPT

## 2024-10-24 PROCEDURE — 99215 OFFICE O/P EST HI 40 MIN: CPT | Performed by: FAMILY MEDICINE

## 2024-10-24 PROCEDURE — 85027 COMPLETE CBC AUTOMATED: CPT

## 2024-10-24 PROCEDURE — 3077F SYST BP >= 140 MM HG: CPT | Performed by: FAMILY MEDICINE

## 2024-10-24 PROCEDURE — 80053 COMPREHEN METABOLIC PANEL: CPT

## 2024-10-24 PROCEDURE — 83519 RIA NONANTIBODY: CPT

## 2024-10-24 PROCEDURE — 1036F TOBACCO NON-USER: CPT | Performed by: FAMILY MEDICINE

## 2024-10-24 ASSESSMENT — ENCOUNTER SYMPTOMS
DEPRESSION: 0
OCCASIONAL FEELINGS OF UNSTEADINESS: 0
LOSS OF SENSATION IN FEET: 0

## 2024-10-24 ASSESSMENT — PATIENT HEALTH QUESTIONNAIRE - PHQ9
1. LITTLE INTEREST OR PLEASURE IN DOING THINGS: NOT AT ALL
2. FEELING DOWN, DEPRESSED OR HOPELESS: NOT AT ALL
SUM OF ALL RESPONSES TO PHQ9 QUESTIONS 1 AND 2: 0

## 2024-10-24 NOTE — PROGRESS NOTES
"Subjective   Patient ID: Raul Cash is a 54 y.o. adult who presents for Disability.    Answers submitted by the patient for this visit:  High Blood Pressure Questionnaire (Submitted on 10/20/2024)  Chief Complaint: Hypertension  Chronicity: chronic  Onset: more than 1 year ago  Progression since onset: gradually worsening  Condition status: resistant  anxiety: Yes  headaches: Yes  shortness of breath: Yes  Agents associated with hypertension: no associated agents  CAD risks: stress  Compliance problems: diet, exercise    Patient is presenting for transfer of care previously seen by Dr. Thornton.  Past medical history is significant for uncontrolled type 1 diabetes with diabetic polyneuropathyHistory of pseudocyst of pancreas due to chronic pancreatitis, GERD, bipolar disorder  She sees a provider named January Gonzales in [place] who prescribes 30 mg of Adderall twice daily alprazolam 1 mg 3 times daily and zolpidem 10 mg nightly.  I reviewed his PDMP right collected this information.It appears that he is also taking Risperdal 2 mg tablet Depakote 250 mg    Patient was hospitalized in February of this year for greater \"trochanter fracture.  While hospitalized in February his hemoglobin A1c was 12.9%.  It appears that his currentMedications include NovoLog U100 18 units 3 times daily before meals and Basaglar 100 units every morning.    Recovering alcoholic.   - drank for multiple years, heavily probaly 10 years   - sober for 8 years, but relapsed last year   - ruined his career; lost his job     Pancreatitis  - 2 years ago   - chronic pain from pseudocysts   - 2 years ago had a really bad episode.     Manic Episodes   - usually a few times per month   - 4-5 x / month   - really hyped up   - buys stuff online   - has panic attacks     vOmiting bright red blood     January Gonzales       Objective   /90 (BP Location: Left arm, Patient Position: Sitting, BP Cuff Size: Large adult)   Pulse 107   Temp 36.2 °C " (97.2 °F) (Skin)   Wt 109 kg (239 lb 6.4 oz)   SpO2 97%   BMI 35.61 kg/m²     Physical Exam:     Constitutional:   General: not in acute distress; malodorous notably anxious; dried blood around mouth.   Appearance: normal appearance, non-toxic, not ill-appearing or diaphoretic.   HENT:   Head: Normocephalic and atraumatic  Eyes: EOMI, PERRL    Neuro: CN II-XII Intact, alert, oriented x3      Assessment/Plan     Raul Cash is a 54 y.o. male presenting to establish care and to discuss disability paperwork that has previously been filled out by Dr. Coker (pcp from this office prior to his [facility]).     Previously received disability due to pancreatitis/pancreatic pseudocysts and polyneuropathy.   Last bout of pancreatitis was 2 years ago; it's unlikely that the pancreatic pseudocysts are contributing to his pain. It's possible that he has underlying gastroparesis in light of his uncontrolled diabetes. He does not have an endocrinologist that he follows with.   Last A1c 12.9% in our records; he claims that his psychiatrist checked his A1c in the 6 range a few months ago; will repeat labwork to determine.   He also states that he really has type 2 diabetes but because of the state of his pancreas it acts like type 1 diabetes. Due to this unclear history will obtain artemio 65 antibodies.   Will place referral to clinical pharmacy for management of type 2 diabetes.     Bipolar 1 disorder   Pt sees January Gonzales for bipolar 1.   He is prescribed risperdal, xanax 1mg TID, ambien 10mg, and adderrall 30mg BID   I don't think it is appropriate for someone having 5 manic episodes per month to be on adderall which could exacerbate anna and put him at risk.   I recommended to him that he discontinue his adderall in light of this situation.   He complains of brain fog; honestly this could be related to the use of 10mg of ambien nightly and xanax TID more so than a diagnosis of ADHD. It is all probably related to his hx of  bipolar disorer.   I attempted to contact his NP in psych but I could not get through to the office staff on the line.   Additionally, a recovering alcoholic should not be prescribed benzodiazpines. I do plan to discuss this with his psych NP.     Hematemesis  - 2 days ago had hematemesis. Discussed that if this happens again he needs to go immediately to the emergency department.   - I suspect he has esophageal varices. He needs to see GI asap.   Placed referral. He lives in [place] and wants to see a GI doctor closer to home; I requested that he search online for one near home otherwise, following up in this area.       In regards to disability paperwork:   From a physical medical standpoint, I do not have any medical conditions that would render him incapable of working; he is going to undergo functional capacity evaluation to see if the polyneuropathy is impacting his ability to work.   I could see from a mental health standpoint having disability; however, he may qualify more for FMLA.   It seems as though he lost his job due to his struggles with alcohol use disorder. In my opinion, I don't think full disability would serve him well, but more so enable his alcohol use.     Problem List Items Addressed This Visit       Type 1 diabetes mellitus with diabetic polyneuropathy    Relevant Orders    Referral to Occupational Therapy    Lipid Panel    CBC    Hemoglobin A1C    Comprehensive Metabolic Panel    Referral to Endocrinology    Glutamic Acid Decarboxylase AB    Referral to Clinical Pharmacy    Bipolar depression (Multi) - Primary    Relevant Orders    Referral to Occupational Therapy    Lipid Panel    CBC    Hemoglobin A1C    Comprehensive Metabolic Panel    Referral to Clinical Pharmacy    Pseudocyst of pancreas due to chronic pancreatitis (Multi)    Relevant Orders    Referral to Occupational Therapy    Lipid Panel    CBC    Hemoglobin A1C    Comprehensive Metabolic Panel    Referral to Clinical Pharmacy     Primary hypertension    Relevant Orders    Referral to Occupational Therapy    Lipid Panel    CBC    Hemoglobin A1C    Comprehensive Metabolic Panel    Referral to Clinical Pharmacy    Morbid obesity (Multi)    Relevant Orders    Referral to Occupational Therapy    Lipid Panel    CBC    Hemoglobin A1C    Comprehensive Metabolic Panel    Referral to Clinical Pharmacy    Bipolar I disorder, current or most recent episode manic, with psychotic features (Multi)    Overview     Comment on above: Added by Problem List Migration; 2013-4-1; Moved to Suppressed Nov 23 2013 4:08PM;         Relevant Orders    Referral to Occupational Therapy    Lipid Panel    CBC    Hemoglobin A1C    Comprehensive Metabolic Panel    Referral to Clinical Pharmacy    Chronic pancreatitis (Multi) (Chronic)    Relevant Orders    Referral to Occupational Therapy    Lipid Panel    CBC    Hemoglobin A1C    Comprehensive Metabolic Panel    Referral to Clinical Pharmacy       Discussed discontinuing adderall   Decrease ambien use to 5mg       Jnenifer Menezes DO     I spent a total of 77 minutes on the date of the service which included preparing to see the patient, face-to-face patient care, completing clinical documentation, performing a medically appropriate examination, counseling and educating the patient/family/caregiver and ordering medications, tests, or procedures.

## 2024-10-24 NOTE — TELEPHONE ENCOUNTER
"Received call from lab regarding increased glucose.  Reviewed.  Called pt on cell number to review, no answer. Left my cell number and asked for ASAP return call.  Will attempt call again     Spoke with patient ~9:30pm.  He acknowledges polyuria and polydipsia.  Some increased fatigue but otherwise feels well.  Eating and drinking normally.  Acknowledges a \"very poor \"diet.  I advised patient go directly to the emergency department.  Reviewed risk of HOCM.  Reviewed risk of dehydration and other electrolyte issues.  He will consider.  Discussed taking extra insulin, he declines.  Wishes to wait and talk to his PCP tomorrow.  No increase water intake, avoid sugar and talk to his PCP tomorrow.  Will consider going to the ED as we discussed.  "

## 2024-10-25 DIAGNOSIS — Z59.86 FINANCIAL INSECURITY DUE TO MEDICAL EXPENSES: ICD-10-CM

## 2024-10-25 DIAGNOSIS — E10.42 TYPE 1 DIABETES MELLITUS WITH DIABETIC POLYNEUROPATHY: Primary | ICD-10-CM

## 2024-10-25 LAB
EST. AVERAGE GLUCOSE BLD GHB EST-MCNC: 289 MG/DL
HBA1C MFR BLD: 11.7 %

## 2024-10-25 RX ORDER — INSULIN ASPART 100 [IU]/ML
18 INJECTION, SOLUTION INTRAVENOUS; SUBCUTANEOUS
Qty: 10 ML | Refills: 12 | Status: SHIPPED | OUTPATIENT
Start: 2024-10-25 | End: 2025-10-25

## 2024-10-25 RX ORDER — INSULIN GLARGINE 100 [IU]/ML
100 INJECTION, SOLUTION SUBCUTANEOUS EVERY MORNING
Qty: 90 ML | Refills: 0 | Status: SHIPPED | OUTPATIENT
Start: 2024-10-25 | End: 2025-01-23

## 2024-10-25 SDOH — ECONOMIC STABILITY - INCOME SECURITY: FINANCIAL INSECURITY: Z59.86

## 2024-10-27 LAB — GAD65 AB SER IA-ACNC: 5.2 IU/ML (ref 0–5)

## 2024-10-30 ENCOUNTER — TELEMEDICINE (OUTPATIENT)
Dept: PHARMACY | Facility: HOSPITAL | Age: 54
End: 2024-10-30
Payer: MEDICARE

## 2024-10-30 DIAGNOSIS — E66.01 MORBID OBESITY (MULTI): ICD-10-CM

## 2024-10-30 DIAGNOSIS — K86.1 CHRONIC PANCREATITIS, UNSPECIFIED PANCREATITIS TYPE (MULTI): Chronic | ICD-10-CM

## 2024-10-30 DIAGNOSIS — I10 PRIMARY HYPERTENSION: ICD-10-CM

## 2024-10-30 DIAGNOSIS — E10.42 TYPE 1 DIABETES MELLITUS WITH DIABETIC POLYNEUROPATHY: ICD-10-CM

## 2024-10-30 DIAGNOSIS — K86.3: ICD-10-CM

## 2024-10-30 DIAGNOSIS — K86.1: ICD-10-CM

## 2024-10-30 RX ORDER — BLOOD-GLUCOSE,RECEIVER,CONT
EACH MISCELLANEOUS
Qty: 1 EACH | Refills: 0 | Status: SHIPPED | OUTPATIENT
Start: 2024-10-30

## 2024-10-30 RX ORDER — BLOOD-GLUCOSE SENSOR
EACH MISCELLANEOUS
Qty: 3 EACH | Refills: 2 | Status: SHIPPED | OUTPATIENT
Start: 2024-10-30

## 2024-10-30 RX ORDER — METFORMIN HYDROCHLORIDE 500 MG/1
500 TABLET, EXTENDED RELEASE ORAL
Qty: 30 TABLET | Refills: 1 | Status: SHIPPED | OUTPATIENT
Start: 2024-10-30

## 2024-11-05 ASSESSMENT — ENCOUNTER SYMPTOMS
VISUAL CHANGE: 1
POLYDIPSIA: 0
HEADACHES: 1
NERVOUS/ANXIOUS: 1
BLACKOUTS: 0
DIZZINESS: 0
SPEECH DIFFICULTY: 0
SEIZURES: 0
HUNGER: 1
WEAKNESS: 0
FATIGUE: 0
TREMORS: 0
CONFUSION: 0
WEIGHT LOSS: 0
POLYPHAGIA: 1
BLURRED VISION: 0
SWEATS: 0

## 2024-11-06 ENCOUNTER — APPOINTMENT (OUTPATIENT)
Dept: PHARMACY | Facility: HOSPITAL | Age: 54
End: 2024-11-06
Payer: MEDICARE

## 2024-11-06 DIAGNOSIS — E10.42 TYPE 1 DIABETES MELLITUS WITH DIABETIC POLYNEUROPATHY: ICD-10-CM

## 2024-11-06 RX ORDER — PEN NEEDLE, DIABETIC 30 GX3/16"
NEEDLE, DISPOSABLE MISCELLANEOUS
Qty: 100 EACH | Refills: 3 | Status: SHIPPED | OUTPATIENT
Start: 2024-11-06

## 2024-11-06 NOTE — PROGRESS NOTES
Clinical Pharmacy Appointment    Patient ID: Raul Cash is a 54 y.o. adult who presents for Diabetes.    Pt is here for Follow Up appointment.     Referring Provider: Jennifer Menezes DO  PCP: Jennifer Menezes DO   Last visit with PCP: 10/24/2024   Next visit with PCP: 11/12/2024    INTERVAL HISTORY   Patient's last appointment with clinical pharmacy team on 10/30/2024  Recent hospitalizations? No   Medication changes? No   Missed doses of medications? No   Side effects? Yes   Patient has had one episode of diarrhea since starting metformin - will monitor for now  Updated relevant labs? No   Changes to diet or exercise regimen? No       Subjective     HPI  DIABETES:    Diagnosed with diabetes: approximately 8-9 years ago  Due to unclear diagnosis (type 1 vs. type 2), patient recently underwent PANDA antibody testing which was positive, suspect DL  Known diabetic complications: none known  Does patient follow with Endocrinology: previously followed  New referral placed and appointment scheduled February 2025  Last eye exam: follows with eye doctor every 6 months     Current diabetic medications include:  Basaglar 100 units once daily at night  Novolog 18 units three times per day with meals  metformin 500 mg once daily with food    Patient denies any missed doses at this time. He has had one episode of diarrhea since starting metformin.    Historical diabetic medications include:  No others, just different insulins due to insurance coverage (Humalog)    Glucose Readings:  Patient has continuous glucose monitoring with Dexcom G7  He just applied the first sensor with 65% of time in range  Previous fasting levels ranging from 150s - 170s   He is aware of how he feels when blood glucose is low (dizzy and lightheadedness) and denies any recent symptoms          Lifestyle:  Diet: 3 meals/day plus evening snack  Tries to limit sugar, does not go out to eat  Occasionally skips lunch (reduces Novolog dose to 10 units)  "  Physical Activity: walks to stores/pharmacy    Secondary Prevention:  Statin? No  ACE-I/ARB? No  Aspirin? No    Pertinent PMH Review:  PMH of Pancreatitis: Yes  PMH of Retinopathy: No  PMH of Urinary Tract Infections: No      Medication System Management  Patient's preferred pharmacy: [facility] in [place], [place] (walking distance for patient)  Adherence/Organization: patient denies missed doses of insulins  Affordability/Accessibility: cost concerns  Patient is on a fixed income due to disability and has difficulty paying for medications and diabetes testing supplies. He may qualify for  Patient Assistance Program, so will proceed with application once patient submits most recent tax documents.      Objective   Allergies   Allergen Reactions    Lithium Hallucinations     Pt gets \"crazy\"    Penicillins Rash     Social History     Social History Narrative    Not on file      Medication Review  Current Outpatient Medications   Medication Instructions    ALPRAZolam (XANAX) 1 mg    amphetamine-dextroamphetamine (Adderall) 30 mg tablet     Basaglar KwikPen U-100 Insulin 100 Units, subcutaneous, Every morning    Dexcom G7  misc Use as instructed    Dexcom G7 Sensor device Apply one sensor under the skin as directed every 10 days.    divalproex (Depakote ER) 250 mg 24 hr tablet 1 tablet, Daily    esomeprazole magnesium (NEXIUM ORAL) Take by mouth.    insulin aspart (NOVOLOG U-100 INSULIN ASPART) 18 Units, subcutaneous, 3 times daily before meals, Take as directed per insulin instructions.    metFORMIN XR (GLUCOPHAGE-XR) 500 mg, oral, Daily with breakfast, Do not crush, chew, or split.    metoprolol tartrate (LOPRESSOR) 100 mg, oral, 2 times daily    OneTouch Ultra Test strip 4 times daily    pen needle, diabetic 32 gauge x 5/32\" needle Use with insulin 4 times daily as directed    risperiDONE (RisperDAL) 2 mg tablet     sildenafil (VIAGRA) 100 mg, oral, Daily PRN    zolpidem (Ambien) 10 mg tablet 1 tablet, " "Nightly PRN      Vitals  BP Readings from Last 2 Encounters:   10/24/24 140/90   08/16/23 (!) 160/100     BMI Readings from Last 1 Encounters:   10/24/24 35.61 kg/m²      Labs  A1C  Lab Results   Component Value Date    HGBA1C 11.7 (H) 10/24/2024    HGBA1C 12.9 (H) 02/11/2024    HGBA1C 11.6 (A) 08/16/2023     BMP  Lab Results   Component Value Date    CALCIUM 9.5 10/24/2024     (L) 10/24/2024    K 4.8 10/24/2024    CO2 20 (L) 10/24/2024    CL 99 10/24/2024    BUN 25 (H) 10/24/2024    CREATININE 1.23 10/24/2024    EGFR 53 (L) 10/24/2024     LFTs  Lab Results   Component Value Date    ALT 18 10/24/2024    AST 11 10/24/2024    ALKPHOS 128 (H) 10/24/2024    BILITOT 0.4 10/24/2024     FLP  Lab Results   Component Value Date    TRIG 459 (H) 10/24/2024    CHOL 211 (H) 10/24/2024    LDLF 95 05/16/2023    LDLCALC  10/24/2024      Comment:      The calculation of LDL and VLDL are inaccurate when the Triglycerides are greater than 400 mg/dL or when the patient is non-fasting. If LDL measurement is necessary contact the testing laboratory for an alternative LDL assay.                                  Near   Borderline      AGE      Desirable  Optimal    High     High     Very High     0-19 Y     0 - 109     ---    110-129   >/= 130     ----    20-24 Y     0 - 119     ---    120-159   >/= 160     ----      >24 Y     0 -  99   100-129  130-159   160-189     >/=190      HDL 36.8 10/24/2024     Urine Microalbumin  No results found for: \"MICROALBCREA\"  Weight Management  Wt Readings from Last 3 Encounters:   10/24/24 109 kg (239 lb 6.4 oz)   08/16/23 112 kg (248 lb)   05/16/23 112 kg (246 lb 12.8 oz)      There is no height or weight on file to calculate BMI.     Assessment/Plan   Problem List Items Addressed This Visit       Type 1 diabetes mellitus with diabetic polyneuropathy    Relevant Medications    pen needle, diabetic 32 gauge x 5/32\" needle    Other Relevant Orders    Referral to Clinical Pharmacy "         DISCUSSION/PLAN:  Patient with diabetes that needs improvement, most recent A1c of 11.7% (goal < 7%) in October 2024. He now has continuous blood glucose monitoring via Dexcom G7 and is able to see how food choices affect his blood glucose levels. Patient is doing well with current regimen of Basaglar, Novolog, and metformin. He denies missed doses or symptoms of hypoglycemia at this time. He did have one episode of diarrhea since starting metformin - will monitor. Continue current regimen and follow up next week.    CONTINUE:  Basaglar 100 units once daily at night  Novolog 18 units three times per day with meals  metformin 500 mg once daily with food    Future Considerations:  Metformin dose titration  Insulin dose adjustments as needed  PRN glucagon depending on cost/coverage  Avoiding DPP4 inhibitors and GLP1 agonists due to chronic pancreatitis/pseudocyst and possible gastroparesis  Avoiding sulfonylureas due to DL  Could consider Jardiance in the future with close monitoring (possible increased risk of DKA in patients with DL)    Monitoring and Education:  Counseled patient on metformin mechanism of action, dosing, side effects, and monitoring  Advised patient to take metformin with meals and hold for any upcoming imaging procedures  Reviewed dosing and duration of action for Basaglar and Novolog  Counseled patient on Dexcom G7 sensor application   Reviewed symptoms and treatment of hypoglycemia  Answered all patient questions    Continue all meds under the continuation of care with the referring provider and clinical pharmacy team.    Clinical Pharmacist follow-up: November 11th, 2024 at 11:00 AM  Orders placed: pen needles sent to [facility] in [place], [place]    Thank you,   Nohelia Quezada, PharmD  Clinical Pharmacy Specialist, Primary Care   [phone]    Verbal consent to manage patient's drug therapy was obtained from the patient . Patient was informed he  may decline to participate or  withdraw from participation in pharmacy services at any time.

## 2024-11-11 ENCOUNTER — APPOINTMENT (OUTPATIENT)
Dept: PHARMACY | Facility: HOSPITAL | Age: 54
End: 2024-11-11
Payer: MEDICARE

## 2024-11-11 DIAGNOSIS — E10.42 TYPE 1 DIABETES MELLITUS WITH DIABETIC POLYNEUROPATHY: ICD-10-CM

## 2024-11-11 RX ORDER — METFORMIN HYDROCHLORIDE 500 MG/1
500 TABLET, EXTENDED RELEASE ORAL 2 TIMES DAILY
Start: 2024-11-11

## 2024-11-11 NOTE — PROGRESS NOTES
Clinical Pharmacy Appointment    Patient ID: Raul Cash is a 54 y.o. adult who presents for Diabetes.    Pt is here for Follow Up appointment.     Referring Provider: Jennifer Menezes DO  PCP: Jennifer Menezes DO   Last visit with PCP: 10/24/2024   Next visit with PCP: 11/12/2024    INTERVAL HISTORY   Patient's last appointment with clinical pharmacy team on 11/6/2024  Recent hospitalizations? No   Medication changes? No   Missed doses of medications? No   Side effects? No  Updated relevant labs? No   Changes to diet or exercise regimen? No       Subjective     HPI  DIABETES:    Diagnosed with diabetes: approximately 8-9 years ago  Due to unclear diagnosis (type 1 vs. type 2), patient recently underwent PANDA antibody testing which was positive, suspect DL  Known diabetic complications: none known  Does patient follow with Endocrinology: previously followed  New referral placed and appointment scheduled February 2025  Last eye exam: follows with eye doctor every 6 months     Current diabetic medications include:  Basaglar 100 units once daily at night  Novolog 18 units three times per day with meals  metformin 500 mg once daily with food    Patient denies any missed doses. He has had one episode of diarrhea since starting metformin. No other side effects at this time.    Historical diabetic medications include:  No others, just different insulins due to insurance coverage (Humalog)    Glucose Readings:  Patient has continuous glucose monitoring with Dexcom G7  He just applied the first sensor with average glucose of 146 mg/dL and 72% of time in range  He is aware of how he feels when blood glucose is low (dizzy and lightheadedness) - he reports one symptomatic episode since last pharmacy appointment when he took his insulin and didn't eat right away          Lifestyle:  Diet: 3 meals/day plus evening snack  Tries to limit sugar, does not go out to eat  Occasionally skips lunch (reduces Novolog dose to 10 units)  "  Physical Activity: walks to stores/pharmacy    Secondary Prevention:  Statin? No  ACE-I/ARB? No  Aspirin? No    Pertinent PMH Review:  PMH of Pancreatitis: Yes  PMH of Retinopathy: No  PMH of Urinary Tract Infections: No      Medication System Management  Patient's preferred pharmacy: [facility] in [place], [place] (walking distance for patient)  Adherence/Organization: patient denies missed doses of insulins  Affordability/Accessibility: cost concerns  Patient is on a fixed income due to disability and has difficulty paying for medications and diabetes testing supplies. He may qualify for  Patient Assistance Program, so will proceed with application once patient submits most recent tax documents.      Objective   Allergies   Allergen Reactions    Lithium Hallucinations     Pt gets \"crazy\"    Penicillins Rash     Social History     Social History Narrative    Not on file      Medication Review  Current Outpatient Medications   Medication Instructions    ALPRAZolam (XANAX) 1 mg    amphetamine-dextroamphetamine (Adderall) 30 mg tablet     Basaglar KwikPen U-100 Insulin 100 Units, subcutaneous, Every morning    Dexcom G7  misc Use as instructed    Dexcom G7 Sensor device Apply one sensor under the skin as directed every 10 days.    divalproex (Depakote ER) 250 mg 24 hr tablet 1 tablet, Daily    esomeprazole magnesium (NEXIUM ORAL) Take by mouth.    insulin aspart (NOVOLOG U-100 INSULIN ASPART) 18 Units, subcutaneous, 3 times daily before meals, Take as directed per insulin instructions.    metFORMIN XR (GLUCOPHAGE-XR) 500 mg, oral, Daily with breakfast, Do not crush, chew, or split.    metoprolol tartrate (LOPRESSOR) 100 mg, oral, 2 times daily    OneTouch Ultra Test strip 4 times daily    pen needle, diabetic 32 gauge x 5/32\" needle Use with insulin 4 times daily as directed    risperiDONE (RisperDAL) 2 mg tablet     sildenafil (VIAGRA) 100 mg, oral, Daily PRN    zolpidem (Ambien) 10 mg tablet 1 tablet, " "Nightly PRN      Vitals  BP Readings from Last 2 Encounters:   10/24/24 140/90   08/16/23 (!) 160/100     BMI Readings from Last 1 Encounters:   10/24/24 35.61 kg/m²      Labs  A1C  Lab Results   Component Value Date    HGBA1C 11.7 (H) 10/24/2024    HGBA1C 12.9 (H) 02/11/2024    HGBA1C 11.6 (A) 08/16/2023     BMP  Lab Results   Component Value Date    CALCIUM 9.5 10/24/2024     (L) 10/24/2024    K 4.8 10/24/2024    CO2 20 (L) 10/24/2024    CL 99 10/24/2024    BUN 25 (H) 10/24/2024    CREATININE 1.23 10/24/2024    EGFR 53 (L) 10/24/2024     LFTs  Lab Results   Component Value Date    ALT 18 10/24/2024    AST 11 10/24/2024    ALKPHOS 128 (H) 10/24/2024    BILITOT 0.4 10/24/2024     FLP  Lab Results   Component Value Date    TRIG 459 (H) 10/24/2024    CHOL 211 (H) 10/24/2024    LDLF 95 05/16/2023    LDLCALC  10/24/2024      Comment:      The calculation of LDL and VLDL are inaccurate when the Triglycerides are greater than 400 mg/dL or when the patient is non-fasting. If LDL measurement is necessary contact the testing laboratory for an alternative LDL assay.                                  Near   Borderline      AGE      Desirable  Optimal    High     High     Very High     0-19 Y     0 - 109     ---    110-129   >/= 130     ----    20-24 Y     0 - 119     ---    120-159   >/= 160     ----      >24 Y     0 -  99   100-129  130-159   160-189     >/=190      HDL 36.8 10/24/2024     Urine Microalbumin  No results found for: \"MICROALBCREA\"  Weight Management  Wt Readings from Last 3 Encounters:   10/24/24 109 kg (239 lb 6.4 oz)   08/16/23 112 kg (248 lb)   05/16/23 112 kg (246 lb 12.8 oz)      There is no height or weight on file to calculate BMI.     Assessment/Plan   Problem List Items Addressed This Visit       Type 1 diabetes mellitus with diabetic polyneuropathy    Relevant Orders    Referral to Clinical Pharmacy       DISCUSSION/PLAN:  Patient with diabetes that needs improvement, most recent A1c of 11.7% " (goal < 7%) in October 2024. He now has continuous blood glucose monitoring via Dexcom G7 with 72% of time in range, and he is able to see how food choices affect his blood glucose levels.     Patient is doing well with current regimen of Basaglar, Novolog, and metformin. He reports one symptomatic episode of hypoglycemia since last appointment when he took his insulin and did not eat right away. He has not had any further side effects with metformin, so will increase dose at this time and follow up in one week.    CONTINUE:  Basaglar 100 units once daily at night  Novolog 18 units three times per day with meals  INCREASE:  metformin to 500 mg twice daily with food    Future Considerations:  Metformin dose titration  Insulin dose adjustments as needed  PRN glucagon depending on cost/coverage  Avoiding DPP4 inhibitors and GLP1 agonists due to chronic pancreatitis/pseudocyst and possible gastroparesis  Avoiding sulfonylureas due to DL  Could consider Jardiance in the future with close monitoring (possible increased risk of DKA in patients with DL)    Monitoring and Education:  Counseled patient on metformin mechanism of action, dosing, side effects, and monitoring  Advised patient to take metformin with meals and hold for any upcoming imaging procedures  Reviewed dosing and duration of action for Basaglar and Novolog  Counseled patient on Dexcom G7 sensor application   Reviewed symptoms and treatment of hypoglycemia  Answered all patient questions    Continue all meds under the continuation of care with the referring provider and clinical pharmacy team.    Clinical Pharmacist follow-up: November 20th, 2024 at 11:30 AM  Orders placed: none at this time    Thank you,   Nohelia Quezada, PharmD  Clinical Pharmacy Specialist, Primary Care   [phone]    Verbal consent to manage patient's drug therapy was obtained from the patient . Patient was informed he  may decline to participate or withdraw from participation in  pharmacy services at any time.

## 2024-11-11 NOTE — Clinical Note
FYI/no action needed: You have a telehealth appointment with this patient tomorrow. Blood glucose levels are significantly improved and patient now has Dexcom G7.

## 2024-11-12 ENCOUNTER — APPOINTMENT (OUTPATIENT)
Dept: PRIMARY CARE | Facility: CLINIC | Age: 54
End: 2024-11-12
Payer: MEDICARE

## 2024-11-12 DIAGNOSIS — F10.21 RECOVERING ALCOHOLIC (MULTI): ICD-10-CM

## 2024-11-12 DIAGNOSIS — E10.42 TYPE 1 DIABETES MELLITUS WITH DIABETIC POLYNEUROPATHY: ICD-10-CM

## 2024-11-12 DIAGNOSIS — I10 PRIMARY HYPERTENSION: Primary | ICD-10-CM

## 2024-11-12 PROCEDURE — 99214 OFFICE O/P EST MOD 30 MIN: CPT | Performed by: FAMILY MEDICINE

## 2024-11-12 PROCEDURE — 3046F HEMOGLOBIN A1C LEVEL >9.0%: CPT | Performed by: FAMILY MEDICINE

## 2024-11-12 NOTE — PROGRESS NOTES
Subjective   Patient ID: Raul Cash is a 54 y.o. adult who presents for No chief complaint on file..    Answers submitted by the patient for this visit:  Diabetes Questionnaire (Submitted on 11/5/2024)  Chief Complaint: Diabetes problem  Diabetes type: type 1  MedicAlert ID: No  Disease duration: 10 Years  blurred vision: No  chest pain: No  fatigue: No  foot paresthesias: No  foot ulcerations: No  polydipsia: No  polyphagia: Yes  polyuria: Yes  visual change: Yes  weakness: No  weight loss: No  Symptom course: stable  confusion: No  speech difficulty: No  dizziness: No  nervous/anxious: Yes  headaches: Yes  hunger: Yes  mood changes: Yes  pallor: No  seizures: No  tremors: No  sleepiness: No  sweats: No  blackouts: No  hospitalization: No  nocturnal hypoglycemia: No  required assistance: No  required glucagon: No  CVA: No  heart disease: No  impotence: Yes  nephropathy: No  peripheral neuropathy: No  PVD: No  retinopathy: No  CAD risks: hypertension, obesity, sedentary lifestyle  Current treatments: diet, insulin injections, oral agent (triple therapy)  Treatment compliance: most of the time  Dose schedule: pre-breakfast, pre-lunch, pre-dinner, at bedtime  Given by: patient  Injection sites: abdominal wall  Home blood tests: 3-4 x per day  Home urines: 1-2 x per month  Monitoring compliance: good  Blood glucose trend: fluctuating dramatically  breakfast time: 7-8 am  breakfast glucose level: >200  lunch time: 12-1 pm  lunch glucose level: 140-180  dinner time: 5-6 pm  dinner glucose level: 140-180  Bedtime: after 11 pm  Bedtime glucose level: >200  High score: 180-200  Overall: 140-180  Weight trend: stable  Current diet: generally unhealthy  Meal planning: avoidance of concentrated sweets  Exercise: rarely  Dietitian visit: No  Eye exam current: Yes  Sees podiatrist: No    Metformin was increased to BID   Lost a few pounds recently after focusing on his diet and blood sugars    Worked with psychiatrist    Decreased to 15mg Adderall BID; they are going to revisit in a couple weeks         By including this disclaimer in the medical record, it is documented that the patient has been informed of the nature and limitations of telehealth services and has consented to participate in them. This disclaimer serves as a record of the patient's agreement to receive medical care through telehealth technology provided by [facility]. As such, no vitals were taken during this visit and the physical exam was limited to the capacity that can be obtained virtually.      There were no vitals taken for this visit.    Physical Exam:     Constitutional:   General: not in acute distress  Appearance: normal appearance, non-toxic, not ill-appearing or diaphoretic.   HENT:   Head: Normocephalic and atraumatic  Eyes: EOMI, PERRL  Neuro: alert, oriented x3      Assessment/Plan     Patient overall doing well/improved from her previous visit.  He has not had any additional episodes of hematemesis.  Additionally he has talked with his nurse practitioner in psych regarding polypharmacy.  Working to decrease/discontinue Adderall we also discussed working to decrease and then discontinue both Xanax and Ambien over time.  He will continue to do this slowly as to not increase or set off episode of anna.    We also did discuss his difficulty w RTA.  In regards to disability I do think it is reasonable to fill this out on a psychiatric basis in regards to bipolar anna and inability to stay consistent with a job.  I think it is less appropriate to provide disability on the basis of chronic pancreatitis, gastroparesis and type 2 diabetesSeeing as this is a treatable condition although slightly more difficult due to underlying psychiatric concerns.  We did discuss that if we could get his medications under better control so therefore they are less sedating and we can manage his manic episodes it would not be out of the question to return to  work in some capacity in the future.    Problem List Items Addressed This Visit       Type 1 diabetes mellitus with diabetic polyneuropathy    Recovering alcoholic (Multi)    Primary hypertension - Primary         Jennifer Menezes DO     I spent a total of 21 minutes on the date of the service which included preparing to see the patient, face-to-face patient care, completing clinical documentation, performing a medically appropriate examination, counseling and educating the patient/family/caregiver and ordering medications, tests, or procedures.

## 2024-11-19 ENCOUNTER — PATIENT MESSAGE (OUTPATIENT)
Dept: PRIMARY CARE | Facility: CLINIC | Age: 54
End: 2024-11-19
Payer: MEDICARE

## 2024-11-19 DIAGNOSIS — N52.9 VASCULOGENIC ERECTILE DYSFUNCTION, UNSPECIFIED VASCULOGENIC ERECTILE DYSFUNCTION TYPE: ICD-10-CM

## 2024-11-19 DIAGNOSIS — I10 PRIMARY HYPERTENSION: ICD-10-CM

## 2024-11-19 DIAGNOSIS — E10.42 TYPE 1 DIABETES MELLITUS WITH DIABETIC POLYNEUROPATHY: ICD-10-CM

## 2024-11-19 RX ORDER — METOPROLOL TARTRATE 100 MG/1
100 TABLET ORAL 2 TIMES DAILY
Qty: 180 TABLET | Refills: 3 | Status: SHIPPED | OUTPATIENT
Start: 2024-11-19 | End: 2025-11-19

## 2024-11-19 RX ORDER — SILDENAFIL 100 MG/1
100 TABLET, FILM COATED ORAL DAILY PRN
Qty: 12 TABLET | Refills: 3 | Status: SHIPPED | OUTPATIENT
Start: 2024-11-19 | End: 2025-11-19

## 2024-11-19 RX ORDER — METFORMIN HYDROCHLORIDE 500 MG/1
500 TABLET, EXTENDED RELEASE ORAL 2 TIMES DAILY
Qty: 60 TABLET | Refills: 2 | Status: SHIPPED | OUTPATIENT
Start: 2024-11-19

## 2024-11-20 ENCOUNTER — APPOINTMENT (OUTPATIENT)
Dept: PHARMACY | Facility: HOSPITAL | Age: 54
End: 2024-11-20
Payer: MEDICARE

## 2024-11-29 ENCOUNTER — APPOINTMENT (OUTPATIENT)
Dept: PHARMACY | Facility: HOSPITAL | Age: 54
End: 2024-11-29
Payer: MEDICARE

## 2024-11-29 DIAGNOSIS — E10.42 TYPE 1 DIABETES MELLITUS WITH DIABETIC POLYNEUROPATHY: ICD-10-CM

## 2024-11-29 RX ORDER — GLUCAGON 3 MG/1
1 POWDER NASAL AS NEEDED
Qty: 2 EACH | Refills: 1 | Status: SHIPPED | OUTPATIENT
Start: 2024-11-29

## 2024-11-29 NOTE — PROGRESS NOTES
Clinical Pharmacy Appointment    Patient ID: Raul Cash is a 54 y.o. adult who presents for Diabetes.    Pt is here for Follow Up appointment.     Referring Provider: Jennifer Menezes DO  PCP: Jennifer Menezes DO   Last visit with PCP: 11/12/2024   Next visit with PCP: not yet scheduled    INTERVAL HISTORY   Patient's last appointment with clinical pharmacy team on 11/11/2024  Recent hospitalizations? No   Medication changes? Yes  Patient has been using OTC cough/cold products due to illness  Missed doses of medications? Yes  He reports occasional missed doses this past week due to illness  Side effects? No  Updated relevant labs? No   Changes to diet or exercise regimen? Yes  Decreased intake and altered sleep/meal schedule due to illness      Subjective     HPI  DIABETES:    Diagnosed with diabetes: approximately 8-9 years ago  Due to unclear diagnosis (type 1 vs. type 2), patient recently underwent PANDA antibody testing which was positive, suspect DL  Known diabetic complications: none known  Does patient follow with Endocrinology: previously followed  New referral placed and appointment scheduled February 2025  Last eye exam: follows with eye doctor every 6 months     Current diabetic medications include:  Basaglar 100 units once daily at night  Novolog 18 units three times per day with meals  metformin 500 mg twice daily with food    Patient reports missed doses of medications this past week due to acute illness. No side effects at this time.    Historical diabetic medications include:  No others, just different insulins due to insurance coverage (Humalog)    Glucose Readings:  Patient has continuous glucose monitoring with Dexcom G7  Average glucose of 168 mg/dL and 58% of time in range  He has had multiple episodes of hypoglycemia over the last week due to illness and decreased intake - treated appropriately, education provided, will order PRN glucagon          Lifestyle:  Diet: 3 meals/day plus evening  "snack  Tries to limit sugar, does not go out to eat  Occasionally skips lunch (reduces Novolog dose to 10 units)   Physical Activity: walks to stores/pharmacy    Secondary Prevention:  Statin? No  ACE-I/ARB? No  Aspirin? No    Pertinent PMH Review:  PMH of Pancreatitis: Yes  PMH of Retinopathy: No  PMH of Urinary Tract Infections: No      Medication System Management  Patient's preferred pharmacy: [facility] in [place], [place] (walking distance for patient)  Adherence/Organization: patient denies missed doses of insulins  Affordability/Accessibility: cost concerns  Patient is on a fixed income due to disability and has difficulty paying for medications and diabetes testing supplies. He may qualify for  Patient Assistance Program, so will proceed with application once patient submits most recent tax documents.      Objective   Allergies   Allergen Reactions    Lithium Hallucinations     Pt gets \"crazy\"    Penicillins Rash     Social History     Social History Narrative    Not on file      Medication Review  Current Outpatient Medications   Medication Instructions    ALPRAZolam (XANAX) 1 mg    amphetamine-dextroamphetamine (Adderall) 30 mg tablet     Basaglar KwikPen U-100 Insulin 100 Units, subcutaneous, Every morning    Dexcom G7  misc Use as instructed    Dexcom G7 Sensor device Apply one sensor under the skin as directed every 10 days.    divalproex (Depakote ER) 250 mg 24 hr tablet 1 tablet, Daily    esomeprazole magnesium (NEXIUM ORAL) Take by mouth.    glucagon (Baqsimi) 3 mg/actuation spray,non-aerosol 1 spray, nasal, As needed    insulin aspart (NOVOLOG U-100 INSULIN ASPART) 18 Units, subcutaneous, 3 times daily before meals, Take as directed per insulin instructions.    metFORMIN XR (GLUCOPHAGE-XR) 500 mg, oral, 2 times daily, Do not crush, chew, or split.    metoprolol tartrate (LOPRESSOR) 100 mg, oral, 2 times daily    OneTouch Ultra Test strip 4 times daily    pen needle, diabetic 32 gauge x " "5/32\" needle Use with insulin 4 times daily as directed    risperiDONE (RisperDAL) 2 mg tablet     sildenafil (VIAGRA) 100 mg, oral, Daily PRN    zolpidem (Ambien) 10 mg tablet 1 tablet, Nightly PRN      Vitals  BP Readings from Last 2 Encounters:   10/24/24 140/90   08/16/23 (!) 160/100     BMI Readings from Last 1 Encounters:   10/24/24 35.61 kg/m²      Labs  A1C  Lab Results   Component Value Date    HGBA1C 11.7 (H) 10/24/2024    HGBA1C 12.9 (H) 02/11/2024    HGBA1C 11.6 (A) 08/16/2023     BMP  Lab Results   Component Value Date    CALCIUM 9.5 10/24/2024     (L) 10/24/2024    K 4.8 10/24/2024    CO2 20 (L) 10/24/2024    CL 99 10/24/2024    BUN 25 (H) 10/24/2024    CREATININE 1.23 10/24/2024    EGFR 53 (L) 10/24/2024     LFTs  Lab Results   Component Value Date    ALT 18 10/24/2024    AST 11 10/24/2024    ALKPHOS 128 (H) 10/24/2024    BILITOT 0.4 10/24/2024     FLP  Lab Results   Component Value Date    TRIG 459 (H) 10/24/2024    CHOL 211 (H) 10/24/2024    LDLF 95 05/16/2023    LDLCALC  10/24/2024      Comment:      The calculation of LDL and VLDL are inaccurate when the Triglycerides are greater than 400 mg/dL or when the patient is non-fasting. If LDL measurement is necessary contact the testing laboratory for an alternative LDL assay.                                  Near   Borderline      AGE      Desirable  Optimal    High     High     Very High     0-19 Y     0 - 109     ---    110-129   >/= 130     ----    20-24 Y     0 - 119     ---    120-159   >/= 160     ----      >24 Y     0 -  99   100-129  130-159   160-189     >/=190      HDL 36.8 10/24/2024     Urine Microalbumin  No results found for: \"MICROALBCREA\"  Weight Management  Wt Readings from Last 3 Encounters:   10/24/24 109 kg (239 lb 6.4 oz)   08/16/23 112 kg (248 lb)   05/16/23 112 kg (246 lb 12.8 oz)      There is no height or weight on file to calculate BMI.     Assessment/Plan   Problem List Items Addressed This Visit       Type 1 diabetes " mellitus with diabetic polyneuropathy    Relevant Medications    glucagon (Baqsimi) 3 mg/actuation spray,non-aerosol    Other Relevant Orders    Referral to Clinical Pharmacy       DISCUSSION/PLAN:  Patient with diabetes that needs improvement, most recent A1c of 11.7% (goal < 7%) in October 2024. He has continuous blood glucose monitoring via Dexcom G7 with high variability over the last week due to acute illness. He reports reduced food intake, poor food choices, missed medication doses, and altered sleep/meal schedule. This has led to both hyperglycemia and multiple episodes of hypoglycemia - treated appropriately. Advised to hold Novolog if not eating and have ordered Baqsimi nasal glucagon if needed. Patient was educated on how and when to use. No other changes at this time, will follow up in one week to reassess.    CONTINUE:  Basaglar 100 units once daily at night  Novolog 18 units three times per day with meals  metformin 500 mg twice daily with food    Future Considerations:  Insulin dose adjustments as needed  Avoiding DPP4 inhibitors and GLP1 agonists due to chronic pancreatitis/pseudocyst and possible gastroparesis  Avoiding sulfonylureas due to DL  Could consider Jardiance in the future with close monitoring (possible increased risk of DKA in patients with DL)    Monitoring and Education:  Counseled patient on mechanism of action, dosing, drug interactions, side effects, and monitoring of diabetes medications  Educated on administration technique and when to use Baqsimi nasal glucagon  Advised to take metformin with meals and hold for any upcoming imaging procedures  Reviewed dosing and duration of action for Basaglar and Novolog  Reviewed symptoms and treatment of hypoglycemia  Answered all patient questions    Continue all meds under the continuation of care with the referring provider and clinical pharmacy team.    Clinical Pharmacist follow-up: December 4th, 2024 at 11:00 AM  Orders placed: none  at this time    Thank you,   Nohelia Quezada, PharmD  Clinical Pharmacy Specialist, Primary Care   [phone]    Verbal consent to manage patient's drug therapy was obtained from the patient . Patient was informed he  may decline to participate or withdraw from participation in pharmacy services at any time.

## 2024-11-29 NOTE — Clinical Note
Patient reports cough/cold symptoms and is requesting an antibiotic. I told him that the office is closed today and he will need an appointment. He plans to reach out on Monday if still symptomatic.

## 2024-12-04 ENCOUNTER — APPOINTMENT (OUTPATIENT)
Dept: PHARMACY | Facility: HOSPITAL | Age: 54
End: 2024-12-04
Payer: MEDICARE

## 2024-12-04 DIAGNOSIS — E10.42 TYPE 1 DIABETES MELLITUS WITH DIABETIC POLYNEUROPATHY: ICD-10-CM

## 2024-12-04 NOTE — PROGRESS NOTES
Clinical Pharmacy Appointment    Patient ID: Raul Cash is a 54 y.o. adult who presents for Diabetes.    Pt is here for Follow Up appointment.     Referring Provider: Jennifer Menezes DO  PCP: Jennifer Menezes DO   Last visit with PCP: 11/12/2024   Next visit with PCP: not yet scheduled    INTERVAL HISTORY   Patient's last appointment with clinical pharmacy team on 11/29/2024  Recent hospitalizations? No   Medication changes? No  Missed doses of medications? No  Side effects? No  Updated relevant labs? No   Changes to diet or exercise regimen? No      Subjective     HPI  DIABETES:    Diagnosed with diabetes: approximately 8-9 years ago  Due to unclear diagnosis (type 1 vs. type 2), patient recently underwent PANDA antibody testing which was positive, suspect DL  Known diabetic complications: none known  Does patient follow with Endocrinology: previously followed  New referral placed and appointment scheduled February 2025  Last eye exam: follows with eye doctor every 6 months     Current diabetic medications include:  Basaglar 100 units once daily at night  Novolog 18 units three times per day with meals  metformin 500 mg twice daily with food    No missed doses or side effects at this time    Historical diabetic medications include:  No others, just different insulins due to insurance coverage (Humalog)    Glucose Readings:  Patient has continuous glucose monitoring with Dexcom G7  Average glucose of 176 mg/dL and 58% of time in range  He is aware of hypoglycemia symptoms and has PRN glucagon available          Lifestyle:  Diet: 3 meals/day plus evening snack  Tries to limit sugar, does not go out to eat  Occasionally skips lunch (reduces Novolog dose to 10 units)   Physical Activity: walks to stores/pharmacy    Secondary Prevention:  Statin? No  ACE-I/ARB? No  Aspirin? No    Pertinent PMH Review:  PMH of Pancreatitis: Yes  PMH of Retinopathy: No  PMH of Urinary Tract Infections: No      Medication System  "Management  Patient's preferred pharmacy: [facility] in [place], [place] (walking distance for patient)  Adherence/Organization: patient denies missed doses of insulins  Affordability/Accessibility: cost concerns  Patient is on a fixed income due to disability and has difficulty paying for medications and diabetes testing supplies. He may qualify for  Patient Assistance Program, so will proceed with application once patient submits most recent tax documents.      Objective   Allergies   Allergen Reactions    Lithium Hallucinations     Pt gets \"crazy\"    Penicillins Rash     Social History     Social History Narrative    Not on file      Medication Review  Current Outpatient Medications   Medication Instructions    ALPRAZolam (XANAX) 1 mg    amphetamine-dextroamphetamine (Adderall) 30 mg tablet     Basaglar KwikPen U-100 Insulin 100 Units, subcutaneous, Every morning    Dexcom G7  misc Use as instructed    Dexcom G7 Sensor device Apply one sensor under the skin as directed every 10 days.    divalproex (Depakote ER) 250 mg 24 hr tablet 1 tablet, Daily    esomeprazole magnesium (NEXIUM ORAL) Take by mouth.    glucagon (Baqsimi) 3 mg/actuation spray,non-aerosol 1 spray, nasal, As needed    insulin aspart (NOVOLOG U-100 INSULIN ASPART) 18 Units, subcutaneous, 3 times daily before meals, Take as directed per insulin instructions.    metFORMIN XR (GLUCOPHAGE-XR) 500 mg, oral, 2 times daily, Do not crush, chew, or split.    metoprolol tartrate (LOPRESSOR) 100 mg, oral, 2 times daily    OneTouch Ultra Test strip 4 times daily    pen needle, diabetic 32 gauge x 5/32\" needle Use with insulin 4 times daily as directed    risperiDONE (RisperDAL) 2 mg tablet     sildenafil (VIAGRA) 100 mg, oral, Daily PRN    zolpidem (Ambien) 10 mg tablet 1 tablet, Nightly PRN      Vitals  BP Readings from Last 2 Encounters:   10/24/24 140/90   08/16/23 (!) 160/100     BMI Readings from Last 1 Encounters:   10/24/24 35.61 kg/m²    " "  Labs  A1C  Lab Results   Component Value Date    HGBA1C 11.7 (H) 10/24/2024    HGBA1C 12.9 (H) 02/11/2024    HGBA1C 11.6 (A) 08/16/2023     BMP  Lab Results   Component Value Date    CALCIUM 9.5 10/24/2024     (L) 10/24/2024    K 4.8 10/24/2024    CO2 20 (L) 10/24/2024    CL 99 10/24/2024    BUN 25 (H) 10/24/2024    CREATININE 1.23 10/24/2024    EGFR 53 (L) 10/24/2024     LFTs  Lab Results   Component Value Date    ALT 18 10/24/2024    AST 11 10/24/2024    ALKPHOS 128 (H) 10/24/2024    BILITOT 0.4 10/24/2024     FLP  Lab Results   Component Value Date    TRIG 459 (H) 10/24/2024    CHOL 211 (H) 10/24/2024    LDLF 95 05/16/2023    LDLCALC  10/24/2024      Comment:      The calculation of LDL and VLDL are inaccurate when the Triglycerides are greater than 400 mg/dL or when the patient is non-fasting. If LDL measurement is necessary contact the testing laboratory for an alternative LDL assay.                                  Near   Borderline      AGE      Desirable  Optimal    High     High     Very High     0-19 Y     0 - 109     ---    110-129   >/= 130     ----    20-24 Y     0 - 119     ---    120-159   >/= 160     ----      >24 Y     0 -  99   100-129  130-159   160-189     >/=190      HDL 36.8 10/24/2024     Urine Microalbumin  No results found for: \"MICROALBCREA\"  Weight Management  Wt Readings from Last 3 Encounters:   10/24/24 109 kg (239 lb 6.4 oz)   08/16/23 112 kg (248 lb)   05/16/23 112 kg (246 lb 12.8 oz)      There is no height or weight on file to calculate BMI.     Assessment/Plan   Problem List Items Addressed This Visit       Type 1 diabetes mellitus with diabetic polyneuropathy    Relevant Orders    Referral to Clinical Pharmacy       DISCUSSION/PLAN:  Patient with diabetes that needs improvement, most recent A1c of 11.7% (goal < 7%) in October 2024. He has continuous blood glucose monitoring via Dexcom G7 with approximately 58% of time in range. He is recovering from recent illness and " blood glucose trends have stabilized. He denies missed doses, side effects, or symptoms of hypoglycemia this past week. Some overnight highs due to late night snacking. No changes to regimen and will follow up in one week.    CONTINUE:  Basaglar 100 units once daily at night  Novolog 18 units three times per day with meals  metformin 500 mg twice daily with food    Future Considerations:  Insulin dose adjustments as needed  Avoiding DPP4 inhibitors and GLP1 agonists due to chronic pancreatitis/pseudocyst and possible gastroparesis  Avoiding sulfonylureas due to DL  Could consider Jardiance in the future with close monitoring (possible increased risk of DKA in patients with DL)    Monitoring and Education:  Counseled patient on mechanism of action, dosing, drug interactions, side effects, and monitoring of diabetes medications  Educated on administration technique and when to use Baqsimi nasal glucagon  Advised to take metformin with meals and hold for any upcoming imaging procedures  Reviewed dosing and duration of action for Basaglar and Novolog  Reviewed symptoms and treatment of hypoglycemia  Answered all patient questions    Continue all meds under the continuation of care with the referring provider and clinical pharmacy team.    Clinical Pharmacist follow-up: December 11th, 2024 at 11:00 AM  Orders placed: none at this time    Thank you,   Nohelia Quezada, PharmD  Clinical Pharmacy Specialist, Primary Care   [phone]    Verbal consent to manage patient's drug therapy was obtained from the patient . Patient was informed he  may decline to participate or withdraw from participation in pharmacy services at any time.

## 2024-12-11 ENCOUNTER — APPOINTMENT (OUTPATIENT)
Dept: PHARMACY | Facility: HOSPITAL | Age: 54
End: 2024-12-11
Payer: MEDICARE

## 2024-12-11 DIAGNOSIS — E10.42 TYPE 1 DIABETES MELLITUS WITH DIABETIC POLYNEUROPATHY: ICD-10-CM

## 2024-12-11 NOTE — PROGRESS NOTES
Clinical Pharmacy Appointment    Patient ID: Raul Cash is a 54 y.o. adult who presents for Diabetes.    Pt is here for Follow Up appointment.     Referring Provider: Jennifer Menezes DO  PCP: Jennifer Menezes DO   Last visit with PCP: 11/12/2024   Next visit with PCP: not yet scheduled    INTERVAL HISTORY   Patient's last appointment with clinical pharmacy team on 12/4/2024  Recent hospitalizations? No   Medication changes? No  Missed doses of medications? No  Side effects? No  Updated relevant labs? No   Changes to diet or exercise regimen? No      Subjective     HPI  DIABETES:    Diagnosed with diabetes: approximately 8-9 years ago  Due to unclear diagnosis (type 1 vs. type 2), patient recently underwent PANDA antibody testing which was positive, suspect DL  Known diabetic complications: none known  Does patient follow with Endocrinology: previously followed  New referral placed and appointment scheduled February 2025  Last eye exam: follows with eye doctor every 6 months     Current diabetic medications include:  Basaglar 100 units once daily at night  Novolog 18 units three times per day with meals  metformin 500 mg twice daily with food    No missed doses or side effects at this time    Historical diabetic medications include:  No others, just different insulins due to insurance coverage (Humalog)    Glucose Readings:  Patient has continuous glucose monitoring with Dexcom G7  Average glucose of 165 mg/dL and 55% of time in range  Data affected by recent sensor malfunction which was reading low for 12+ hours  He is aware of hypoglycemia symptoms and has PRN glucagon available          Lifestyle:  Diet: 3 meals/day plus evening snack  Tries to limit sugar, does not go out to eat  Occasionally skips lunch  Physical Activity: walks to stores/pharmacy    Secondary Prevention:  Statin? No  ACE-I/ARB? No  Aspirin? No    Pertinent PMH Review:  PMH of Pancreatitis: Yes  PMH of Retinopathy: No  PMH of Urinary Tract  "Infections: No      Medication System Management  Patient's preferred pharmacy: [facility] in [place], [place] (walking distance for patient)  Adherence/Organization: patient denies missed doses of insulins  Affordability/Accessibility: cost concerns  Patient is on a fixed income due to disability and has difficulty paying for medications and diabetes testing supplies. He may qualify for  Patient Assistance Program, so will proceed with application once patient submits most recent tax documents.      Objective   Allergies   Allergen Reactions    Lithium Hallucinations     Pt gets \"crazy\"    Penicillins Rash     Social History     Social History Narrative    Not on file      Medication Review  Current Outpatient Medications   Medication Instructions    ALPRAZolam (XANAX) 1 mg    amphetamine-dextroamphetamine (Adderall) 30 mg tablet     Basaglar KwikPen U-100 Insulin 100 Units, subcutaneous, Every morning    Dexcom G7  misc Use as instructed    Dexcom G7 Sensor device Apply one sensor under the skin as directed every 10 days.    divalproex (Depakote ER) 250 mg 24 hr tablet 1 tablet, Daily    esomeprazole magnesium (NEXIUM ORAL) Take by mouth.    glucagon (Baqsimi) 3 mg/actuation spray,non-aerosol 1 spray, nasal, As needed    insulin aspart (NovoLOG U-100 Insulin aspart) 100 unit/mL injection Inject 18 units with breakfast, 15 units with lunch, and 18 units with dinner    metFORMIN XR (GLUCOPHAGE-XR) 500 mg, oral, 2 times daily, Do not crush, chew, or split.    metoprolol tartrate (LOPRESSOR) 100 mg, oral, 2 times daily    OneTouch Ultra Test strip 4 times daily    pen needle, diabetic 32 gauge x 5/32\" needle Use with insulin 4 times daily as directed    risperiDONE (RisperDAL) 2 mg tablet     sildenafil (VIAGRA) 100 mg, oral, Daily PRN    zolpidem (Ambien) 10 mg tablet 1 tablet, Nightly PRN      Vitals  BP Readings from Last 2 Encounters:   10/24/24 140/90   08/16/23 (!) 160/100     BMI Readings from Last 1 " "Encounters:   10/24/24 35.61 kg/m²      Labs  A1C  Lab Results   Component Value Date    HGBA1C 11.7 (H) 10/24/2024    HGBA1C 12.9 (H) 02/11/2024    HGBA1C 11.6 (A) 08/16/2023     BMP  Lab Results   Component Value Date    CALCIUM 9.5 10/24/2024     (L) 10/24/2024    K 4.8 10/24/2024    CO2 20 (L) 10/24/2024    CL 99 10/24/2024    BUN 25 (H) 10/24/2024    CREATININE 1.23 10/24/2024    EGFR 53 (L) 10/24/2024     LFTs  Lab Results   Component Value Date    ALT 18 10/24/2024    AST 11 10/24/2024    ALKPHOS 128 (H) 10/24/2024    BILITOT 0.4 10/24/2024     FLP  Lab Results   Component Value Date    TRIG 459 (H) 10/24/2024    CHOL 211 (H) 10/24/2024    LDLF 95 05/16/2023    LDLCALC  10/24/2024      Comment:      The calculation of LDL and VLDL are inaccurate when the Triglycerides are greater than 400 mg/dL or when the patient is non-fasting. If LDL measurement is necessary contact the testing laboratory for an alternative LDL assay.                                  Near   Borderline      AGE      Desirable  Optimal    High     High     Very High     0-19 Y     0 - 109     ---    110-129   >/= 130     ----    20-24 Y     0 - 119     ---    120-159   >/= 160     ----      >24 Y     0 -  99   100-129  130-159   160-189     >/=190      HDL 36.8 10/24/2024     Urine Microalbumin  No results found for: \"MICROALBCREA\"  Weight Management  Wt Readings from Last 3 Encounters:   10/24/24 109 kg (239 lb 6.4 oz)   08/16/23 112 kg (248 lb)   05/16/23 112 kg (246 lb 12.8 oz)      There is no height or weight on file to calculate BMI.     Assessment/Plan   Problem List Items Addressed This Visit       Type 1 diabetes mellitus with diabetic polyneuropathy    Relevant Medications    insulin aspart (NovoLOG U-100 Insulin aspart) 100 unit/mL injection    Other Relevant Orders    Referral to Clinical Pharmacy       DISCUSSION/PLAN:  Patient with diabetes that needs improvement, most recent A1c of 11.7% (goal < 7%) in October 2024. He " has continuous blood glucose monitoring via Dexcom G7 with recent data affected by sensor malfunction. He denies symptoms of hypoglycemia although he has noticed that blood glucose runs on the lower end of normal range after lunch. He continues to have overnight highs due to late night snacking. Will reduce Novolog dose with lunch and consider adding an extra Novolog dose at night with snacks if trend continues. Follow up in one week.    CONTINUE:  Basaglar 100 units once daily at night  Novolog 18 units with breakfast and dinner  metformin 500 mg twice daily with food  DECREASE:  Novolog to 15 units with lunch    Future Considerations:  Insulin dose adjustments as needed  Avoiding DPP4 inhibitors and GLP1 agonists due to chronic pancreatitis/pseudocyst and possible gastroparesis  Avoiding sulfonylureas due to DL  Could consider Jardiance in the future with close monitoring (possible increased risk of DKA in patients with DL)    Monitoring and Education:  Counseled patient on mechanism of action, dosing, drug interactions, side effects, and monitoring of diabetes medications  Educated on administration technique and when to use Baqsimi nasal glucagon  Advised to take metformin with meals and hold for any upcoming imaging procedures  Reviewed dosing and duration of action for Basaglar and Novolog  Reviewed symptoms and treatment of hypoglycemia  Answered all patient questions    Continue all meds under the continuation of care with the referring provider and clinical pharmacy team.    Clinical Pharmacist follow-up: December 17th, 2024 at 11:00 AM  Orders placed: none at this time    Thank you,   Nohelia Quezada, PharmD  Clinical Pharmacy Specialist, Primary Care   [phone]    Verbal consent to manage patient's drug therapy was obtained from the patient . Patient was informed he  may decline to participate or withdraw from participation in pharmacy services at any time.

## 2024-12-12 RX ORDER — INSULIN ASPART 100 [IU]/ML
INJECTION, SOLUTION INTRAVENOUS; SUBCUTANEOUS
Start: 2024-12-12

## 2024-12-17 ENCOUNTER — APPOINTMENT (OUTPATIENT)
Dept: PHARMACY | Facility: HOSPITAL | Age: 54
End: 2024-12-17
Payer: MEDICARE

## 2024-12-17 DIAGNOSIS — E10.42 TYPE 1 DIABETES MELLITUS WITH DIABETIC POLYNEUROPATHY: ICD-10-CM

## 2024-12-17 RX ORDER — INSULIN ASPART 100 [IU]/ML
INJECTION, SOLUTION INTRAVENOUS; SUBCUTANEOUS
Start: 2024-12-17

## 2024-12-17 NOTE — PROGRESS NOTES
Clinical Pharmacy Appointment    Patient ID: Raul Cash is a 54 y.o. adult who presents for Diabetes.    Pt is here for Follow Up appointment.     Referring Provider: Jennifer Menezes DO  PCP: Jennifer Menezes DO   Last visit with PCP: 11/12/2024   Next visit with PCP: not yet scheduled    INTERVAL HISTORY   Patient's last appointment with clinical pharmacy team on 12/11/2024  Recent hospitalizations? No   Medication changes? No  Missed doses of medications? No  Side effects? No  Updated relevant labs? No   Changes to diet or exercise regimen? No  Continues with healthy food choices during the day, snacks in the evening      Subjective     HPI  DIABETES:    Diagnosed with diabetes: approximately 8-9 years ago  Due to unclear diagnosis (type 1 vs. type 2), patient recently underwent PANDA antibody testing which was positive, suspect DL  Known diabetic complications: none known  Does patient follow with Endocrinology: previously followed  New referral placed and appointment scheduled February 2025  Last eye exam: follows with eye doctor every 6 months     Current diabetic medications include:  Basaglar 100 units once daily at night  Novolog 18 units with breakfast, 15 units with lunch, 18 units with dinner  metformin 500 mg twice daily with food    No missed doses or side effects at this time    Historical diabetic medications include:  No others, just different insulins due to insurance coverage (Humalog)    Glucose Readings:  Patient has continuous glucose monitoring with Dexcom G7  Average glucose of 172 mg/dL and 50% of time in range  Data affected by recent sensor malfunctions  He is aware of hypoglycemia symptoms and has PRN glucagon available          Lifestyle:  Diet: 3 meals/day plus evening snack  Tries to limit sugar, does not go out to eat  Breakfast: almond milk, banana  Lunch: usually salad and sandwich  Dinner: variable, a lot of skinless chicken breast, rice  Evening snacks: chips,  "cereal  Physical Activity: walks to stores/pharmacy    Secondary Prevention:  Statin? No  ACE-I/ARB? No  Aspirin? No    Pertinent PMH Review:  PMH of Pancreatitis: Yes  PMH of Retinopathy: No  PMH of Urinary Tract Infections: No      Medication System Management  Patient's preferred pharmacy: [facility] in [place], [place] (walking distance for patient)  Adherence/Organization: patient denies missed doses of insulins  Affordability/Accessibility: cost concerns  Patient is on a fixed income due to disability and has difficulty paying for medications and diabetes testing supplies. He may qualify for  Patient Assistance Program, so will proceed with application once patient submits most recent tax documents.      Objective   Allergies   Allergen Reactions    Lithium Hallucinations     Pt gets \"crazy\"    Penicillins Rash     Social History     Social History Narrative    Not on file      Medication Review  Current Outpatient Medications   Medication Instructions    ALPRAZolam (XANAX) 1 mg    amphetamine-dextroamphetamine (Adderall) 30 mg tablet     Basaglar KwikPen U-100 Insulin 100 Units, subcutaneous, Every morning    Dexcom G7  misc Use as instructed    Dexcom G7 Sensor device Apply one sensor under the skin as directed every 10 days.    divalproex (Depakote ER) 250 mg 24 hr tablet 1 tablet, Daily    esomeprazole magnesium (NEXIUM ORAL) Take by mouth.    glucagon (Baqsimi) 3 mg/actuation spray,non-aerosol 1 spray, nasal, As needed    insulin aspart (NovoLOG U-100 Insulin aspart) 100 unit/mL injection Inject 18 units with breakfast, 15 units with lunch, 18 units with dinner, and 8 units with evening snack    metFORMIN XR (GLUCOPHAGE-XR) 500 mg, oral, 2 times daily, Do not crush, chew, or split.    metoprolol tartrate (LOPRESSOR) 100 mg, oral, 2 times daily    OneTouch Ultra Test strip 4 times daily    pen needle, diabetic 32 gauge x 5/32\" needle Use with insulin 4 times daily as directed    risperiDONE " "(RisperDAL) 2 mg tablet     sildenafil (VIAGRA) 100 mg, oral, Daily PRN    zolpidem (Ambien) 10 mg tablet 1 tablet, Nightly PRN      Vitals  BP Readings from Last 2 Encounters:   10/24/24 140/90   08/16/23 (!) 160/100     BMI Readings from Last 1 Encounters:   10/24/24 35.61 kg/m²      Labs  A1C  Lab Results   Component Value Date    HGBA1C 11.7 (H) 10/24/2024    HGBA1C 12.9 (H) 02/11/2024    HGBA1C 11.6 (A) 08/16/2023     BMP  Lab Results   Component Value Date    CALCIUM 9.5 10/24/2024     (L) 10/24/2024    K 4.8 10/24/2024    CO2 20 (L) 10/24/2024    CL 99 10/24/2024    BUN 25 (H) 10/24/2024    CREATININE 1.23 10/24/2024    EGFR 53 (L) 10/24/2024     LFTs  Lab Results   Component Value Date    ALT 18 10/24/2024    AST 11 10/24/2024    ALKPHOS 128 (H) 10/24/2024    BILITOT 0.4 10/24/2024     FLP  Lab Results   Component Value Date    TRIG 459 (H) 10/24/2024    CHOL 211 (H) 10/24/2024    LDLF 95 05/16/2023    LDLCALC  10/24/2024      Comment:      The calculation of LDL and VLDL are inaccurate when the Triglycerides are greater than 400 mg/dL or when the patient is non-fasting. If LDL measurement is necessary contact the testing laboratory for an alternative LDL assay.                                  Near   Borderline      AGE      Desirable  Optimal    High     High     Very High     0-19 Y     0 - 109     ---    110-129   >/= 130     ----    20-24 Y     0 - 119     ---    120-159   >/= 160     ----      >24 Y     0 -  99   100-129  130-159   160-189     >/=190      HDL 36.8 10/24/2024     Urine Microalbumin  No results found for: \"MICROALBCREA\"  Weight Management  Wt Readings from Last 3 Encounters:   10/24/24 109 kg (239 lb 6.4 oz)   08/16/23 112 kg (248 lb)   05/16/23 112 kg (246 lb 12.8 oz)      There is no height or weight on file to calculate BMI.     Assessment/Plan   Problem List Items Addressed This Visit       Type 1 diabetes mellitus with diabetic polyneuropathy    Relevant Medications    insulin " aspart (NovoLOG U-100 Insulin aspart) 100 unit/mL injection    Other Relevant Orders    Referral to Clinical Pharmacy       DISCUSSION/PLAN:  Patient with diabetes that needs improvement, most recent A1c of 11.7% (goal < 7%) in October 2024. He has continuous blood glucose monitoring via Dexcom G7 with recent data affected by sensor malfunction. He denies symptoms of hypoglycemia at this time. We discussed his diet in detail at this visit and he has been making better food choices during the day, but continues to have overnight hyperglycemia due to late night snacking. Will start additional Novolog dose of 8 units with nighttime snack and follow up in two weeks.    CONTINUE:  Basaglar 100 units once daily at night  Novolog 18 units with breakfast, 15 units with lunch, 18 units with dinner  metformin 500 mg twice daily with food  START:  Novolog 8 units with nighttime snack    Future Considerations:  Insulin dose adjustments as needed  Avoiding DPP4 inhibitors and GLP1 agonists due to chronic pancreatitis/pseudocyst and possible gastroparesis  Avoiding sulfonylureas due to DL  Could consider Jardiance in the future with close monitoring (possible increased risk of DKA in patients with DL)    Monitoring and Education:  Counseled patient on mechanism of action, dosing, drug interactions, side effects, and monitoring of diabetes medications  Advised to take metformin with meals and hold for any upcoming imaging procedures  Reviewed Baqsimi injection technique and Dexcom sensor application  Reviewed dosing and duration of action for Basaglar and Novolog  Answered all patient questions    Continue all meds under the continuation of care with the referring provider and clinical pharmacy team.    Clinical Pharmacist follow-up: January 3rd, 2025 at 11:30 AM  Orders placed: none at this time    Thank you,   Nohelia Quezada, PharmD  Clinical Pharmacy Specialist, Primary Care   [phone]    Verbal consent to manage  patient's drug therapy was obtained from the patient . Patient was informed he  may decline to participate or withdraw from participation in pharmacy services at any time.

## 2025-01-03 ENCOUNTER — APPOINTMENT (OUTPATIENT)
Dept: PHARMACY | Facility: HOSPITAL | Age: 55
End: 2025-01-03
Payer: MEDICARE

## 2025-01-03 DIAGNOSIS — E10.42 TYPE 1 DIABETES MELLITUS WITH DIABETIC POLYNEUROPATHY: ICD-10-CM

## 2025-01-03 NOTE — PROGRESS NOTES
Clinical Pharmacy Appointment    Patient ID: Raul Cash is a 54 y.o. adult who presents for Diabetes.    Pt is here for Follow Up appointment.     Referring Provider: Jennifer Menezes DO  PCP: Jennifer Menezes DO   Last visit with PCP: 11/12/2024   Next visit with PCP: not yet scheduled    INTERVAL HISTORY   Patient's last appointment with clinical pharmacy team on 12/17/2024  Recent hospitalizations? No   Medication changes? No  Missed doses of medications? Yes  Patient reports missing multiple doses of insulin and metformin around the holidays  Side effects? No  Updated relevant labs? No   Changes to diet or exercise regimen? Yes  Holiday meals/treats      Subjective     HPI  DIABETES:    Diagnosed with diabetes: approximately 8-9 years ago  Due to unclear diagnosis (type 1 vs. type 2), patient recently underwent PANDA antibody testing which was positive, suspect DL  Known diabetic complications: none known  Does patient follow with Endocrinology: previously followed  New referral placed and appointment scheduled February 2025  Last eye exam: follows with eye doctor every 6 months     Current diabetic medications include:  Basaglar 100 units once daily at night  Novolog 18 units with breakfast, 15 units with lunch, 18 units with dinner, 8 units with nighttime snack  metformin 500 mg twice daily with food    No side effects at this time, but patient has missed several recent doses.    Historical diabetic medications include:  No others, just different insulins due to insurance coverage (Humalog)    Glucose Readings:  Patient has continuous glucose monitoring with Dexcom G7  Average glucose of 255 mg/dL and 26% of time in range  He is aware of hypoglycemia symptoms and has PRN glucagon available  Patient has had one episode of hypoglycemia since last pharmacy appointment - treated appropriately          Lifestyle:  Diet: 3 meals/day plus evening snack  Tries to limit sugar, does not go out to eat  Breakfast:  "almond milk, banana  Lunch: usually salad and sandwich  Dinner: variable, a lot of skinless chicken breast, rice  Evening snacks: chips, cereal  Physical Activity: walks to stores/pharmacy    Secondary Prevention:  Statin? No  ACE-I/ARB? No  Aspirin? No    Pertinent PMH Review:  PMH of Pancreatitis: Yes  PMH of Retinopathy: No  PMH of Urinary Tract Infections: No      Medication System Management  Patient's preferred pharmacy: [facility] in [place], [place] (walking distance for patient)  Adherence/Organization: patient denies missed doses of insulins  Affordability/Accessibility: cost concerns  Patient is on a fixed income due to disability and has difficulty paying for medications and diabetes testing supplies. He may qualify for  Patient Assistance Program, so will proceed with application once patient submits most recent tax documents.      Objective   Allergies   Allergen Reactions    Lithium Hallucinations     Pt gets \"crazy\"    Penicillins Rash     Social History     Social History Narrative    Not on file      Medication Review  Current Outpatient Medications   Medication Instructions    ALPRAZolam (XANAX) 1 mg    amphetamine-dextroamphetamine (Adderall) 30 mg tablet     Basaglar KwikPen U-100 Insulin 100 Units, subcutaneous, Every morning    Dexcom G7  misc Use as instructed    Dexcom G7 Sensor device Apply one sensor under the skin as directed every 10 days.    divalproex (Depakote ER) 250 mg 24 hr tablet 1 tablet, Daily    esomeprazole magnesium (NEXIUM ORAL) Take by mouth.    glucagon (Baqsimi) 3 mg/actuation spray,non-aerosol 1 spray, nasal, As needed    insulin aspart (NovoLOG U-100 Insulin aspart) 100 unit/mL injection Inject 18 units with breakfast, 15 units with lunch, 18 units with dinner, and 8 units with evening snack    metFORMIN XR (GLUCOPHAGE-XR) 500 mg, oral, 2 times daily, Do not crush, chew, or split.    metoprolol tartrate (LOPRESSOR) 100 mg, oral, 2 times daily    OneTouch Ultra " "Test strip 4 times daily    pen needle, diabetic 32 gauge x 5/32\" needle Use with insulin 4 times daily as directed    risperiDONE (RisperDAL) 2 mg tablet     sildenafil (VIAGRA) 100 mg, oral, Daily PRN    zolpidem (Ambien) 10 mg tablet 1 tablet, Nightly PRN      Vitals  BP Readings from Last 2 Encounters:   10/24/24 140/90   08/16/23 (!) 160/100     BMI Readings from Last 1 Encounters:   10/24/24 35.61 kg/m²      Labs  A1C  Lab Results   Component Value Date    HGBA1C 11.7 (H) 10/24/2024    HGBA1C 12.9 (H) 02/11/2024    HGBA1C 11.6 (A) 08/16/2023     BMP  Lab Results   Component Value Date    CALCIUM 9.5 10/24/2024     (L) 10/24/2024    K 4.8 10/24/2024    CO2 20 (L) 10/24/2024    CL 99 10/24/2024    BUN 25 (H) 10/24/2024    CREATININE 1.23 10/24/2024    EGFR 53 (L) 10/24/2024     LFTs  Lab Results   Component Value Date    ALT 18 10/24/2024    AST 11 10/24/2024    ALKPHOS 128 (H) 10/24/2024    BILITOT 0.4 10/24/2024     FLP  Lab Results   Component Value Date    TRIG 459 (H) 10/24/2024    CHOL 211 (H) 10/24/2024    LDLF 95 05/16/2023    LDLCALC  10/24/2024      Comment:      The calculation of LDL and VLDL are inaccurate when the Triglycerides are greater than 400 mg/dL or when the patient is non-fasting. If LDL measurement is necessary contact the testing laboratory for an alternative LDL assay.                                  Near   Borderline      AGE      Desirable  Optimal    High     High     Very High     0-19 Y     0 - 109     ---    110-129   >/= 130     ----    20-24 Y     0 - 119     ---    120-159   >/= 160     ----      >24 Y     0 -  99   100-129  130-159   160-189     >/=190      HDL 36.8 10/24/2024     Urine Microalbumin  No results found for: \"MICROALBCREA\"  Weight Management  Wt Readings from Last 3 Encounters:   10/24/24 109 kg (239 lb 6.4 oz)   08/16/23 112 kg (248 lb)   05/16/23 112 kg (246 lb 12.8 oz)      There is no height or weight on file to calculate BMI.     Assessment/Plan "   Problem List Items Addressed This Visit       Type 1 diabetes mellitus with diabetic polyneuropathy    Relevant Orders    Referral to Clinical Pharmacy       DISCUSSION/PLAN:  Patient with diabetes that needs improvement, most recent A1c of 11.7% (goal < 7%) in October 2024. He has continuous blood glucose monitoring via Dexcom G7. He has missed several doses of insulin around the holidays which have contributed to hyperglycemia in combination with holiday foods. Patient has been back on track the last few days, so expect blood glucose to trend back down. Continue current medications and reassess adherence in one week.    CONTINUE:  Basaglar 100 units once daily at night  metformin 500 mg twice daily with food  Novolog   18 units with breakfast  15 units with lunch  18 units with dinner  8 units with nighttime snack    Future Considerations:  Insulin dose adjustments as needed  Avoiding DPP4 inhibitors and GLP1 agonists due to chronic pancreatitis/pseudocyst and possible gastroparesis  Avoiding sulfonylureas due to DL  Could consider Jardiance in the future with close monitoring (possible increased risk of DKA in patients with DL)    Monitoring and Education:  Counseled patient on mechanism of action, dosing, drug interactions, side effects, and monitoring of diabetes medications  Advised to take metformin with meals and hold for any upcoming imaging procedures  Reviewed Baqsimi injection technique and Dexcom sensor application  Reviewed dosing and duration of action for Basaglar and Novolog  Answered all patient questions    Continue all meds under the continuation of care with the referring provider and clinical pharmacy team.    Clinical Pharmacist follow-up: January 9th, 2025 at 9:00 AM  Orders placed: none at this time    Thank you,   Nohelia Quezada, PharmD  Clinical Pharmacy Specialist, Primary Care   [phone]    Verbal consent to manage patient's drug therapy was obtained from the patient . Patient  was informed he  may decline to participate or withdraw from participation in pharmacy services at any time.

## 2025-01-09 ENCOUNTER — APPOINTMENT (OUTPATIENT)
Dept: PHARMACY | Facility: HOSPITAL | Age: 55
End: 2025-01-09
Payer: MEDICARE

## 2025-01-09 DIAGNOSIS — E10.42 TYPE 1 DIABETES MELLITUS WITH DIABETIC POLYNEUROPATHY: ICD-10-CM

## 2025-01-09 DIAGNOSIS — Z59.86 FINANCIAL INSECURITY DUE TO MEDICAL EXPENSES: ICD-10-CM

## 2025-01-09 RX ORDER — INSULIN ASPART 100 [IU]/ML
INJECTION, SOLUTION INTRAVENOUS; SUBCUTANEOUS
Qty: 45 ML | Refills: 1 | Status: SHIPPED | OUTPATIENT
Start: 2025-01-09

## 2025-01-09 RX ORDER — INSULIN GLARGINE 100 [IU]/ML
100 INJECTION, SOLUTION SUBCUTANEOUS DAILY
Qty: 90 ML | Refills: 1 | Status: SHIPPED | OUTPATIENT
Start: 2025-01-09

## 2025-01-09 SDOH — ECONOMIC STABILITY - INCOME SECURITY: FINANCIAL INSECURITY: Z59.86

## 2025-01-09 NOTE — PROGRESS NOTES
Clinical Pharmacy Appointment    Patient ID: Raul Cash is a 54 y.o. adult who presents for Diabetes.    Pt is here for Follow Up appointment.     Referring Provider: Jennifer Menezes DO  PCP: Jennifer Menezes DO   Last visit with PCP: 11/12/2024   Next visit with PCP: not yet scheduled    INTERVAL HISTORY   Patient's last appointment with clinical pharmacy team on 1/3/2025  Recent hospitalizations? No   Medication changes? No  Missed doses of medications? Yes  One recent missed dose of metformin  Side effects? No  Updated relevant labs? No   Changes to diet or exercise regimen? No      Subjective     HPI  DIABETES:    Diagnosed with diabetes: approximately 8-9 years ago  Due to unclear diagnosis (type 1 vs. type 2), patient recently underwent PANDA antibody testing which was positive, suspect DL  Known diabetic complications: none known  Does patient follow with Endocrinology: previously followed  New referral placed and appointment scheduled February 2025  Last eye exam: follows with eye doctor every 6 months     Current diabetic medications include:  Basaglar 100 units once daily at night  Novolog 18 units with breakfast, 15 units with lunch, 18 units with dinner, 8 units with nighttime snack  metformin 500 mg twice daily with food    No side effects at this time, but patient has missed at least one recent dose of metformin.    Historical diabetic medications include:  No others, just different insulins due to insurance coverage (Humalog)    Glucose Readings:  Patient has continuous glucose monitoring with Dexcom G7  Average glucose of 200 mg/dL and 47% of time in range  He is aware of hypoglycemia symptoms and has PRN glucagon available  No symptoms since last appointment          Lifestyle:  Diet: 3 meals/day plus evening snack  Tries to limit sugar, does not go out to eat  Breakfast: almond milk, banana  Lunch: usually salad and sandwich  Dinner: variable, a lot of skinless chicken breast, rice  Evening  "snacks: chips, cereal  Physical Activity: walks to stores/pharmacy    Secondary Prevention:  Statin? No  ACE-I/ARB? No  Aspirin? No    Pertinent PMH Review:  PMH of Pancreatitis: Yes  PMH of Retinopathy: No  PMH of Urinary Tract Infections: No      Medication System Management  Patient's preferred pharmacy: [facility] in [place], [place] (walking distance for patient)  Adherence/Organization: patient denies missed doses of insulins  Affordability/Accessibility: cost concerns  Patient is on a fixed income due to disability and has difficulty paying for medications and diabetes testing supplies. He may qualify for  Patient Assistance Program, so will proceed with application once patient submits most recent tax documents.      Objective   Allergies   Allergen Reactions    Lithium Hallucinations     Pt gets \"crazy\"    Penicillins Rash     Social History     Social History Narrative    Not on file      Medication Review  Current Outpatient Medications   Medication Instructions    ALPRAZolam (XANAX) 1 mg    amphetamine-dextroamphetamine (Adderall) 30 mg tablet     Basaglar KwikPen U-100 Insulin 100 Units, subcutaneous, Daily    Dexcom G7  misc Use as instructed    Dexcom G7 Sensor device Apply one sensor under the skin as directed every 10 days.    divalproex (Depakote ER) 250 mg 24 hr tablet 1 tablet, Daily    esomeprazole magnesium (NEXIUM ORAL) Take by mouth.    glucagon (Baqsimi) 3 mg/actuation spray,non-aerosol 1 spray, nasal, As needed    insulin aspart (NovoLOG) 100 unit/mL (3 mL) pen Inject 18 units with breakfast, 15 units with lunch, 18 units with dinner, and 8 units with evening snack    metFORMIN XR (GLUCOPHAGE-XR) 500 mg, oral, 2 times daily, Do not crush, chew, or split.    metoprolol tartrate (LOPRESSOR) 100 mg, oral, 2 times daily    OneTouch Ultra Test strip 4 times daily    pen needle, diabetic 32 gauge x 5/32\" needle Use with insulin 4 times daily as directed    risperiDONE (RisperDAL) 2 mg " "tablet     sildenafil (VIAGRA) 100 mg, oral, Daily PRN    zolpidem (Ambien) 10 mg tablet 1 tablet, Nightly PRN      Vitals  BP Readings from Last 2 Encounters:   10/24/24 140/90   08/16/23 (!) 160/100     BMI Readings from Last 1 Encounters:   10/24/24 35.61 kg/m²      Labs  A1C  Lab Results   Component Value Date    HGBA1C 11.7 (H) 10/24/2024    HGBA1C 12.9 (H) 02/11/2024    HGBA1C 11.6 (A) 08/16/2023     BMP  Lab Results   Component Value Date    CALCIUM 9.5 10/24/2024     (L) 10/24/2024    K 4.8 10/24/2024    CO2 20 (L) 10/24/2024    CL 99 10/24/2024    BUN 25 (H) 10/24/2024    CREATININE 1.23 10/24/2024    EGFR 53 (L) 10/24/2024     LFTs  Lab Results   Component Value Date    ALT 18 10/24/2024    AST 11 10/24/2024    ALKPHOS 128 (H) 10/24/2024    BILITOT 0.4 10/24/2024     FLP  Lab Results   Component Value Date    TRIG 459 (H) 10/24/2024    CHOL 211 (H) 10/24/2024    LDLF 95 05/16/2023    LDLCALC  10/24/2024      Comment:      The calculation of LDL and VLDL are inaccurate when the Triglycerides are greater than 400 mg/dL or when the patient is non-fasting. If LDL measurement is necessary contact the testing laboratory for an alternative LDL assay.                                  Near   Borderline      AGE      Desirable  Optimal    High     High     Very High     0-19 Y     0 - 109     ---    110-129   >/= 130     ----    20-24 Y     0 - 119     ---    120-159   >/= 160     ----      >24 Y     0 -  99   100-129  130-159   160-189     >/=190      HDL 36.8 10/24/2024     Urine Microalbumin  No results found for: \"MICROALBCREA\"  Weight Management  Wt Readings from Last 3 Encounters:   10/24/24 109 kg (239 lb 6.4 oz)   08/16/23 112 kg (248 lb)   05/16/23 112 kg (246 lb 12.8 oz)      There is no height or weight on file to calculate BMI.     Assessment/Plan   Problem List Items Addressed This Visit       Type 1 diabetes mellitus with diabetic polyneuropathy    Relevant Medications    insulin glargine " (Basaglar KwikPen U-100 Insulin) 100 unit/mL (3 mL) pen    insulin aspart (NovoLOG) 100 unit/mL (3 mL) pen    Other Relevant Orders    Referral to Clinical Pharmacy     Other Visit Diagnoses       Financial insecurity due to medical expenses                DISCUSSION/PLAN:  Patient with diabetes that needs improvement, most recent A1c of 11.7% (goal < 7%) in October 2024. He has continuous blood glucose monitoring via Dexcom G7 with no recent symptoms of hypoglycemia. Blood glucose has trended back down into target range as patient has resumed regular insulin dosing and eating habits. Continue current diabetes regimen and follow up in one week.    CONTINUE:  Basaglar 100 units once daily at night  metformin 500 mg twice daily with food  Novolog   18 units with breakfast  15 units with lunch  18 units with dinner  8 units with nighttime snack    Future Considerations:  Insulin dose adjustments as needed  Avoiding DPP4 inhibitors and GLP1 agonists due to chronic pancreatitis/pseudocyst and possible gastroparesis  Avoiding sulfonylureas due to DL  Could consider Jardiance in the future with close monitoring (possible increased risk of DKA in patients with DL)    Monitoring and Education:  Counseled patient on mechanism of action, dosing, drug interactions, side effects, and monitoring of diabetes medications  Advised to take metformin with meals and hold for any upcoming imaging procedures  Reviewed Baqsimi injection technique and Dexcom sensor application  Reviewed dosing and duration of action for Basaglar and Novolog  Answered all patient questions    Continue all meds under the continuation of care with the referring provider and clinical pharmacy team.    Clinical Pharmacist follow-up: January 16th, 2025 at 10:00 AM  Orders placed: Basaglar and Novolog refills sent to [facility] in [place], [place]    Thank you,   Nohelia Quezada, PharmD  Clinical Pharmacy Specialist, Primary Care    [phone]    Verbal consent to manage patient's drug therapy was obtained from the patient . Patient was informed he  may decline to participate or withdraw from participation in pharmacy services at any time.

## 2025-01-16 ENCOUNTER — APPOINTMENT (OUTPATIENT)
Dept: PHARMACY | Facility: HOSPITAL | Age: 55
End: 2025-01-16
Payer: MEDICARE

## 2025-01-16 DIAGNOSIS — E10.42 TYPE 1 DIABETES MELLITUS WITH DIABETIC POLYNEUROPATHY: ICD-10-CM

## 2025-01-16 RX ORDER — DEXTROAMPHETAMINE SACCHARATE, AMPHETAMINE ASPARTATE, DEXTROAMPHETAMINE SULFATE AND AMPHETAMINE SULFATE 3.75; 3.75; 3.75; 3.75 MG/1; MG/1; MG/1; MG/1
15 TABLET ORAL EVERY EVENING
COMMUNITY

## 2025-01-16 NOTE — PROGRESS NOTES
Clinical Pharmacy Appointment    Patient ID: Raul Cash is a 55 y.o. adult who presents for Diabetes.    Pt is here for Follow Up appointment.     Referring Provider: Jennifer Menezes DO  PCP: Jennifer Menezes DO   Last visit with PCP: 11/12/2024   Next visit with PCP: not yet scheduled    INTERVAL HISTORY   Patient's last appointment with clinical pharmacy team on 1/9/2025  Recent hospitalizations? No   Medication changes? Yes  Changes to Adderall and divalproex per psych - updated med list  Missed doses of medications? No  Side effects? No  Updated relevant labs? No   Changes to diet or exercise regimen? No      Subjective     HPI  DIABETES:    Diagnosed with diabetes: approximately 8-9 years ago  Due to unclear diagnosis (type 1 vs. type 2), patient recently underwent PANDA antibody testing which was positive, suspect DL  Known diabetic complications: none known  Does patient follow with Endocrinology: previously followed  New referral placed and appointment scheduled February 2025  Last eye exam: follows with eye doctor every 6 months     Current diabetic medications include:  Basaglar 100 units once daily at night  Novolog 18 units with breakfast, 15 units with lunch, 18 units with dinner, 8 units with nighttime snack  metformin 500 mg twice daily with food    No side effects or missed doses at this time    Historical diabetic medications include:  No others, just different insulins due to insurance coverage (Humalog)    Glucose Readings:  Patient has continuous glucose monitoring with Dexcom G7  Average glucose of 154 mg/dL and 71% of time in range  He is aware of hypoglycemia symptoms and has PRN glucagon available  Patient did have some low readings yesterday - treated appropriately          Lifestyle:  Diet: 3 meals/day plus evening snack  Tries to limit sugar, does not go out to eat  Breakfast: almond milk, breakfast sandwich, banana  Lunch: usually salad and bologna sandwich  Dinner: variable, pork  "chop or skinless chicken breast, rice  Evening snacks: chips, cereal  Physical Activity: walks to stores/pharmacy    Secondary Prevention:  Statin? No  ACE-I/ARB? No  Aspirin? No    Pertinent PMH Review:  PMH of Pancreatitis: Yes  PMH of Retinopathy: No  PMH of Urinary Tract Infections: No      Medication System Management  Patient's preferred pharmacy: [facility] in [place], [place] (walking distance for patient)  Adherence/Organization: patient denies missed doses of insulins  Affordability/Accessibility: cost concerns  Patient is on a fixed income due to disability and has difficulty paying for medications and diabetes testing supplies. He may qualify for  Patient Assistance Program, so will proceed with application once patient submits most recent tax documents.      Objective   Allergies   Allergen Reactions    Lithium Hallucinations     Pt gets \"crazy\"    Penicillins Rash     Social History     Social History Narrative    Not on file      Medication Review  Current Outpatient Medications   Medication Instructions    ALPRAZolam (XANAX) 1 mg    amphetamine-dextroamphetamine (Adderall) 15 mg tablet 15 mg, oral, Every evening    amphetamine-dextroamphetamine (Adderall) 30 mg tablet Take 1 tablet (30 mg) by mouth once daily.    Basaglar KwikPen U-100 Insulin 100 Units, subcutaneous, Daily    Dexcom G7  misc Use as instructed    Dexcom G7 Sensor device Apply one sensor under the skin as directed every 10 days.    divalproex (Depakote ER) 250 mg 24 hr tablet 1 tablet, oral, 2 times daily    esomeprazole magnesium (NEXIUM ORAL) Take by mouth.    glucagon (Baqsimi) 3 mg/actuation spray,non-aerosol 1 spray, nasal, As needed    insulin aspart (NovoLOG) 100 unit/mL (3 mL) pen Inject 18 units with breakfast, 15 units with lunch, 18 units with dinner, and 8 units with evening snack    metFORMIN XR (GLUCOPHAGE-XR) 500 mg, oral, 2 times daily, Do not crush, chew, or split.    metoprolol tartrate (LOPRESSOR) 100 mg, " "oral, 2 times daily    OneTouch Ultra Test strip 4 times daily    pen needle, diabetic 32 gauge x 5/32\" needle Use with insulin 4 times daily as directed    risperiDONE (RisperDAL) 2 mg tablet     sildenafil (VIAGRA) 100 mg, oral, Daily PRN    zolpidem (Ambien) 10 mg tablet 1 tablet, Nightly PRN      Vitals  BP Readings from Last 2 Encounters:   10/24/24 140/90   08/16/23 (!) 160/100     BMI Readings from Last 1 Encounters:   10/24/24 35.61 kg/m²      Labs  A1C  Lab Results   Component Value Date    HGBA1C 11.7 (H) 10/24/2024    HGBA1C 12.9 (H) 02/11/2024    HGBA1C 11.6 (A) 08/16/2023     BMP  Lab Results   Component Value Date    CALCIUM 9.5 10/24/2024     (L) 10/24/2024    K 4.8 10/24/2024    CO2 20 (L) 10/24/2024    CL 99 10/24/2024    BUN 25 (H) 10/24/2024    CREATININE 1.23 10/24/2024    EGFR 53 (L) 10/24/2024     LFTs  Lab Results   Component Value Date    ALT 18 10/24/2024    AST 11 10/24/2024    ALKPHOS 128 (H) 10/24/2024    BILITOT 0.4 10/24/2024     FLP  Lab Results   Component Value Date    TRIG 459 (H) 10/24/2024    CHOL 211 (H) 10/24/2024    LDLF 95 05/16/2023    LDLCALC  10/24/2024      Comment:      The calculation of LDL and VLDL are inaccurate when the Triglycerides are greater than 400 mg/dL or when the patient is non-fasting. If LDL measurement is necessary contact the testing laboratory for an alternative LDL assay.                                  Near   Borderline      AGE      Desirable  Optimal    High     High     Very High     0-19 Y     0 - 109     ---    110-129   >/= 130     ----    20-24 Y     0 - 119     ---    120-159   >/= 160     ----      >24 Y     0 -  99   100-129  130-159   160-189     >/=190      HDL 36.8 10/24/2024     Urine Microalbumin  No results found for: \"MICROALBCREA\"  Weight Management  Wt Readings from Last 3 Encounters:   10/24/24 109 kg (239 lb 6.4 oz)   08/16/23 112 kg (248 lb)   05/16/23 112 kg (246 lb 12.8 oz)      There is no height or weight on file to " calculate BMI.     Assessment/Plan   Problem List Items Addressed This Visit       Type 1 diabetes mellitus with diabetic polyneuropathy    Relevant Orders    Referral to Clinical Pharmacy       DISCUSSION/PLAN:  Patient with diabetes that needs improvement, most recent A1c of 11.7% (goal < 7%) in October 2024. He has continuous blood glucose monitoring via Dexcom G7 with 71% of time in range. He did have some low readings yesterday - asymptomatic and treated appropriately. He reports no difference in eating habits or insulin dosing. Will continue current regimen with close monitoring and follow up in two weeks.    CONTINUE:  Basaglar 100 units once daily at night  metformin 500 mg twice daily with food  Novolog   18 units with breakfast  15 units with lunch  18 units with dinner  8 units with nighttime snack    Future Considerations:  Insulin dose adjustments as needed  Avoiding DPP4 inhibitors and GLP1 agonists due to chronic pancreatitis/pseudocyst and possible gastroparesis  Avoiding sulfonylureas due to DL  Could consider Jardiance in the future with close monitoring (possible increased risk of DKA in patients with DL)    Monitoring and Education:  Counseled patient on mechanism of action, dosing, drug interactions, side effects, and monitoring of diabetes medications  Advised to take metformin with meals and hold for any upcoming imaging procedures  Reviewed Baqsimi injection technique and Dexcom sensor application  Reviewed dosing and duration of action for Basaglar and Novolog  Answered all patient questions    Continue all meds under the continuation of care with the referring provider and clinical pharmacy team.    Clinical Pharmacist follow-up: January 30th, 2025 at 10:00 AM  Orders placed: none at this time    Thank you,   Nohelia Quezada, PharmD  Clinical Pharmacy Specialist, Primary Care   [phone]    Verbal consent to manage patient's drug therapy was obtained from the patient . Patient was  informed he  may decline to participate or withdraw from participation in pharmacy services at any time.

## 2025-01-17 ENCOUNTER — APPOINTMENT (OUTPATIENT)
Facility: CLINIC | Age: 55
End: 2025-01-17
Payer: MEDICARE

## 2025-01-22 ENCOUNTER — TELEPHONE (OUTPATIENT)
Dept: PHARMACY | Facility: HOSPITAL | Age: 55
End: 2025-01-22
Payer: MEDICARE

## 2025-01-22 NOTE — TELEPHONE ENCOUNTER
Clinical Pharmacy Phone Call    Received phone call from patient who is frustrated with his Dexcom system due to alarms waking him and his girlfriend overnight. We discussed how to silence the alarms, but I told patient I do not recommend this due to high insulin requirements and risk for hypoglycemia.     Patient was slurring speech during the call and sounded a little off. He reports missing recent doses of insulin which is contributing to alarms for hyperglycemia. I advised him to resume self-monitoring with glucometer if he takes Dexcom sensor off. Will follow up next week as planned.    Clinical Pharmacist follow-up: January 30th, 2025 at 10:00 AM     Thank you,   Nohelia Quezada, PharmD  Clinical Pharmacy Specialist, Primary Care   [phone]

## 2025-01-30 ENCOUNTER — APPOINTMENT (OUTPATIENT)
Dept: PHARMACY | Facility: HOSPITAL | Age: 55
End: 2025-01-30
Payer: MEDICARE

## 2025-01-30 DIAGNOSIS — E10.42 TYPE 1 DIABETES MELLITUS WITH DIABETIC POLYNEUROPATHY: ICD-10-CM

## 2025-01-30 RX ORDER — BLOOD SUGAR DIAGNOSTIC
STRIP MISCELLANEOUS
Qty: 100 EACH | Refills: 3 | Status: SHIPPED | OUTPATIENT
Start: 2025-01-30

## 2025-01-30 NOTE — PROGRESS NOTES
Clinical Pharmacy Appointment    Patient ID: Raul Cash is a 55 y.o. adult who presents for Diabetes.    Pt is here for Follow Up appointment.     Referring Provider: Jennifer Menezes DO  PCP: Jennifer Menezes DO   Last visit with PCP: 11/12/2024   Next visit with PCP: not yet scheduled    INTERVAL HISTORY   Patient's last appointment with clinical pharmacy team on 1/16/2025  Recent hospitalizations? No   Medication changes? No  Missed doses of medications? No  Side effects? No  Updated relevant labs? No   Changes to diet or exercise regimen? No      Subjective     HPI  DIABETES:    Diagnosed with diabetes: approximately 8-9 years ago  Due to unclear diagnosis (type 1 vs. type 2), patient recently underwent PANDA antibody testing which was positive, suspect DL  Known diabetic complications: none known  Does patient follow with Endocrinology: previously followed  New referral placed and appointment scheduled February 2025  Last eye exam: follows with eye doctor every 6 months     Current diabetic medications include:  Basaglar 100 units once daily at night  Novolog 18 units with breakfast, 15 units with lunch, 18 units with dinner, 8 units with nighttime snack  metformin 500 mg twice daily with food    No side effects or missed doses at this time    Historical diabetic medications include:  No others, just different insulins due to insurance coverage (Humalog)    Glucose Readings:  Patient has continuous glucose monitoring with Dexcom G7  He is currently taking a break from Dexcom due to alarms and fixating on numbers  He has been checking blood glucose with glucometer 3-4 times per day  He reports recent fasting readings in the high 100s mg/dL  He is aware of hypoglycemia symptoms and has PRN glucagon available  Last occurrence over the weekend - BG 85 mg/dL, treated appropriately with glucose tablets    Lifestyle:  Diet: 3 meals/day plus evening snack  Tries to limit sugar, does not go out to eat  Breakfast:  "almond milk, breakfast sandwich, banana  Lunch: usually salad and bologna sandwich  Dinner: variable, pork chop or skinless chicken breast, rice  Evening snacks: chips, cereal  Physical Activity: walks to stores/pharmacy    Secondary Prevention:  Statin? No  ACE-I/ARB? No  Aspirin? No    Pertinent PMH Review:  PMH of Pancreatitis: Yes  PMH of Retinopathy: No  PMH of Urinary Tract Infections: No      Medication System Management  Patient's preferred pharmacy: [facility] in [place], [place] (walking distance for patient)  Adherence/Organization: patient denies missed doses of insulins  Affordability/Accessibility: cost concerns  Patient is on a fixed income due to disability and has difficulty paying for medications and diabetes testing supplies. He may qualify for  Patient Assistance Program, so will proceed with application once patient submits most recent tax documents.      Objective   Allergies   Allergen Reactions    Lithium Hallucinations     Pt gets \"crazy\"    Penicillins Rash     Social History     Social History Narrative    Not on file      Medication Review  Current Outpatient Medications   Medication Instructions    ALPRAZolam (XANAX) 1 mg    amphetamine-dextroamphetamine (Adderall) 15 mg tablet 15 mg, oral, Every evening    amphetamine-dextroamphetamine (Adderall) 30 mg tablet Take 1 tablet (30 mg) by mouth once daily.    Basaglar KwikPen U-100 Insulin 100 Units, subcutaneous, Daily    Dexcom G7  misc Use as instructed    Dexcom G7 Sensor device Apply one sensor under the skin as directed every 10 days.    divalproex (Depakote ER) 250 mg 24 hr tablet 1 tablet, oral, 2 times daily    esomeprazole magnesium (NEXIUM ORAL) Take by mouth.    glucagon (Baqsimi) 3 mg/actuation spray,non-aerosol 1 spray, nasal, As needed    insulin aspart (NovoLOG) 100 unit/mL (3 mL) pen Inject 18 units with breakfast, 15 units with lunch, 18 units with dinner, and 8 units with evening snack    metFORMIN XR " "(GLUCOPHAGE-XR) 500 mg, oral, 2 times daily, Do not crush, chew, or split.    metoprolol tartrate (LOPRESSOR) 100 mg, oral, 2 times daily    OneTouch Ultra Test strip Use as directed 4 times daily to check blood glucose    pen needle, diabetic 32 gauge x 5/32\" needle Use with insulin 4 times daily as directed    risperiDONE (RisperDAL) 2 mg tablet     sildenafil (VIAGRA) 100 mg, oral, Daily PRN    zolpidem (Ambien) 10 mg tablet 1 tablet, Nightly PRN      Vitals  BP Readings from Last 2 Encounters:   10/24/24 140/90   08/16/23 (!) 160/100     BMI Readings from Last 1 Encounters:   10/24/24 35.61 kg/m²      Labs  A1C  Lab Results   Component Value Date    HGBA1C 11.7 (H) 10/24/2024    HGBA1C 12.9 (H) 02/11/2024    HGBA1C 11.6 (A) 08/16/2023     BMP  Lab Results   Component Value Date    CALCIUM 9.5 10/24/2024     (L) 10/24/2024    K 4.8 10/24/2024    CO2 20 (L) 10/24/2024    CL 99 10/24/2024    BUN 25 (H) 10/24/2024    CREATININE 1.23 10/24/2024    EGFR 53 (L) 10/24/2024     LFTs  Lab Results   Component Value Date    ALT 18 10/24/2024    AST 11 10/24/2024    ALKPHOS 128 (H) 10/24/2024    BILITOT 0.4 10/24/2024     FLP  Lab Results   Component Value Date    TRIG 459 (H) 10/24/2024    CHOL 211 (H) 10/24/2024    LDLF 95 05/16/2023    LDLCALC  10/24/2024      Comment:      The calculation of LDL and VLDL are inaccurate when the Triglycerides are greater than 400 mg/dL or when the patient is non-fasting. If LDL measurement is necessary contact the testing laboratory for an alternative LDL assay.                                  Near   Borderline      AGE      Desirable  Optimal    High     High     Very High     0-19 Y     0 - 109     ---    110-129   >/= 130     ----    20-24 Y     0 - 119     ---    120-159   >/= 160     ----      >24 Y     0 -  99   100-129  130-159   160-189     >/=190      HDL 36.8 10/24/2024     Urine Microalbumin  No results found for: \"MICROALBCREA\"  Weight Management  Wt Readings from Last " 3 Encounters:   10/24/24 109 kg (239 lb 6.4 oz)   08/16/23 112 kg (248 lb)   05/16/23 112 kg (246 lb 12.8 oz)      There is no height or weight on file to calculate BMI.     Assessment/Plan   Problem List Items Addressed This Visit       Type 1 diabetes mellitus with diabetic polyneuropathy    Relevant Medications    OneTouch Ultra Test strip    Other Relevant Orders    Referral to Clinical Pharmacy    Hemoglobin A1c       DISCUSSION/PLAN:  Patient with diabetes that needs improvement, most recent A1c of 11.7% (goal < 7%) in October 2024. He is currently taking a break from his Dexcom G7 due to alarms and fixation on the readings. Patient is now checking blood glucose 3-4 times per day and reports recent fasting levels in the high 100s mg/dL. He continues to have occasional symptoms of hypoglycemia - treats appropriately with glucose tablets and has PRN glucagon available. Will continue current regimen for now and reassess in three weeks. Patient aware that he is due for updated A1c but has transportation issues. He is scheduled for first endocrinology appointment next month, so he may be able to get POC drawn in office.    CONTINUE:  Basaglar 100 units once daily at night  metformin 500 mg twice daily with food  Novolog   18 units with breakfast  15 units with lunch  18 units with dinner  8 units with nighttime snack    Future Considerations:  Insulin dose adjustments as needed  Avoiding DPP4 inhibitors and GLP1 agonists due to chronic pancreatitis/pseudocyst and possible gastroparesis  Avoiding sulfonylureas due to DL  Could consider Jardiance in the future with close monitoring (possible increased risk of DKA in patients with DL)    Monitoring and Education:  Counseled patient on mechanism of action, dosing, drug interactions, side effects, and monitoring of diabetes medications  Reviewed symptoms and treatment of hypoglycemia, including Baqsimi  Answered all patient questions    Continue all meds under the  continuation of care with the referring provider and clinical pharmacy team.    Clinical Pharmacist follow-up: February 19th, 2025 at 10:40 AM  Orders placed: A1c lab, test strips refill to [facility] in [place], [place]    Thank you,   Nohelia Quezada, PharmD  Clinical Pharmacy Specialist, Primary Care   [phone]    Verbal consent to manage patient's drug therapy was obtained from the patient . Patient was informed he  may decline to participate or withdraw from participation in pharmacy services at any time.

## 2025-02-19 ENCOUNTER — APPOINTMENT (OUTPATIENT)
Dept: PHARMACY | Facility: HOSPITAL | Age: 55
End: 2025-02-19
Payer: MEDICARE

## 2025-02-20 ENCOUNTER — OFFICE VISIT (OUTPATIENT)
Dept: ENDOCRINOLOGY | Age: 55
End: 2025-02-20
Payer: COMMERCIAL

## 2025-02-20 VITALS
HEIGHT: 70 IN | BODY MASS INDEX: 37.08 KG/M2 | SYSTOLIC BLOOD PRESSURE: 136 MMHG | OXYGEN SATURATION: 97 % | WEIGHT: 259 LBS | HEART RATE: 58 BPM | DIASTOLIC BLOOD PRESSURE: 84 MMHG

## 2025-02-20 DIAGNOSIS — E66.09 CLASS 2 OBESITY DUE TO EXCESS CALORIES WITHOUT SERIOUS COMORBIDITY WITH BODY MASS INDEX (BMI) OF 37.0 TO 37.9 IN ADULT: ICD-10-CM

## 2025-02-20 DIAGNOSIS — E10.65 TYPE 1 DIABETES MELLITUS WITH HYPERGLYCEMIA (HCC): Primary | ICD-10-CM

## 2025-02-20 DIAGNOSIS — E66.812 CLASS 2 OBESITY DUE TO EXCESS CALORIES WITHOUT SERIOUS COMORBIDITY WITH BODY MASS INDEX (BMI) OF 37.0 TO 37.9 IN ADULT: ICD-10-CM

## 2025-02-20 DIAGNOSIS — E78.2 MIXED HYPERLIPIDEMIA: ICD-10-CM

## 2025-02-20 LAB — HBA1C MFR BLD: 8.2 %

## 2025-02-20 PROCEDURE — 3075F SYST BP GE 130 - 139MM HG: CPT | Performed by: CLINICAL NURSE SPECIALIST

## 2025-02-20 PROCEDURE — 99205 OFFICE O/P NEW HI 60 MIN: CPT | Performed by: CLINICAL NURSE SPECIALIST

## 2025-02-20 PROCEDURE — 83036 HEMOGLOBIN GLYCOSYLATED A1C: CPT | Performed by: CLINICAL NURSE SPECIALIST

## 2025-02-20 PROCEDURE — 3079F DIAST BP 80-89 MM HG: CPT | Performed by: CLINICAL NURSE SPECIALIST

## 2025-02-20 RX ORDER — METFORMIN HYDROCHLORIDE 500 MG/1
500 TABLET, EXTENDED RELEASE ORAL 2 TIMES DAILY
COMMUNITY
Start: 2024-11-19 | End: 2025-02-20

## 2025-02-20 RX ORDER — INSULIN GLARGINE 100 [IU]/ML
60 INJECTION, SOLUTION SUBCUTANEOUS NIGHTLY
Qty: 5 ADJUSTABLE DOSE PRE-FILLED PEN SYRINGE | Refills: 5
Start: 2025-02-20

## 2025-02-20 RX ORDER — INSULIN GLARGINE 100 [IU]/ML
50 INJECTION, SOLUTION SUBCUTANEOUS 2 TIMES DAILY
Qty: 1 EACH | Refills: 0 | Status: SHIPPED | OUTPATIENT
Start: 2025-02-20

## 2025-02-20 RX ORDER — ROSUVASTATIN CALCIUM 20 MG/1
20 TABLET, COATED ORAL NIGHTLY
Qty: 30 TABLET | Refills: 6 | Status: SHIPPED | OUTPATIENT
Start: 2025-02-20

## 2025-02-20 RX ORDER — INSULIN ASPART 100 [IU]/ML
20 INJECTION, SOLUTION INTRAVENOUS; SUBCUTANEOUS
Qty: 5 ADJUSTABLE DOSE PRE-FILLED PEN SYRINGE | Refills: 5
Start: 2025-02-20

## 2025-02-20 NOTE — PROGRESS NOTES
[facility] [employer]  [facility] Department of Endocrinology Diabetes and Metabolism   [address], [place], [place], [address]  Phone: [phone]  Fax: [phone]    Date of Service: 2/20/2025    Primary Care Physician: Kirk Gerber MD  Referring physician: Kirk Gerber MD  Provider: EDIL Gipson CNS     Reason for the visit:  Type 1 DM       History of Present Illness:  The history is provided by the patient. No  was used. Accuracy of the patient data is excellent.  Vladislav Mccormick is a very pleasant 55 y.o. male seen today for diabetes management     Vladislav Mccormick was diagnosed with diabetes at age of 47   and currently on Lantus 50 units BID, Novolog 12 units TID with meals   Patient states he does miss doses  Attempted dexcom but had skin reaction   The patient has been checking blood sugar 3 times per day      .    Most recent A1c results summarized below  Lab Results   Component Value Date/Time    LABA1C 12.9 02/11/2024 05:49 AM    LABA1C 15.2 04/12/2015 10:10 PM    LABA1C 13.4 02/14/2015 01:45 AM     Patient reported infrequent hypoglycemic episodes  The patient hasn't been mindful of what has been eating and wasn't following diabetes diet    I reviewed current medications and the patient has no issues with diabetes medications  Vladislav Mccormick is up to date with eye exam and denied any history of diabetic retinopathy      And also performs  own feet care  Microvascular complications:  No Retinopathy, Nephropathy or Neuropathy   Macrovascular complications: no CAD, PVD, or Stroke      PAST MEDICAL HISTORY   Past Medical History:   Diagnosis Date    Aspiration pneumonia of right upper lobe, unspecified aspiration pneumonia type (HCC) 2/10/2024    Bipolar 1 disorder, depressed (HCC)     Depression     Diabetes mellitus type 2, uncontrolled 12/15/2014    GERD (gastroesophageal reflux disease)     Hypertension     Pancreatitis        PAST SURGICAL

## 2025-02-25 ENCOUNTER — APPOINTMENT (OUTPATIENT)
Dept: PHARMACY | Facility: HOSPITAL | Age: 55
End: 2025-02-25
Payer: MEDICARE

## 2025-02-25 DIAGNOSIS — E10.42 TYPE 1 DIABETES MELLITUS WITH DIABETIC POLYNEUROPATHY: ICD-10-CM

## 2025-02-25 RX ORDER — ROSUVASTATIN CALCIUM 20 MG/1
20 TABLET, COATED ORAL DAILY
COMMUNITY

## 2025-02-25 RX ORDER — INSULIN ASPART 100 [IU]/ML
INJECTION, SOLUTION INTRAVENOUS; SUBCUTANEOUS
Start: 2025-02-25

## 2025-02-25 NOTE — Clinical Note
A1c down to 8.2%!! He is now established with endocrinology, so I won't change diabetes meds but I'm going to keep checking in to make sure he stays on track.

## 2025-02-25 NOTE — PROGRESS NOTES
Clinical Pharmacy Appointment    Patient ID: aRul Cash is a 55 y.o. adult who presents for Diabetes.    Pt is here for Follow Up appointment.     Referring Provider: Jennifer Menezes DO  PCP: Jennifer Menezes DO   Last visit with PCP: 11/12/2024   Next visit with PCP: not yet scheduled    INTERVAL HISTORY   Patient's last appointment with clinical pharmacy team on 1/30/2025  Recent hospitalizations? No   Medication changes? Yes  Started rosuvastatin, stopped metformin, adjusted insulin per endocrinology  Missed doses of medications? Yes  He endorses occasional missed doses  Side effects? No  Updated relevant labs? Yes  A1c decreased to 8.2% (previously 11.7%)  Changes to diet or exercise regimen? No      Subjective     HPI  DIABETES:    Diagnosed with diabetes: approximately 8-9 years ago  Due to unclear diagnosis (type 1 vs. type 2), patient recently underwent PANDA antibody testing which was positive, suspect DL  Known diabetic complications: none known  Does patient follow with Endocrinology: yes  Last visit: 2/20/2025  Next visit: 6/5/2025  Last eye exam: follows with eye doctor every 6 months     Current diabetic medications include:  Basaglar 100 units once daily at night  Novolog 20 units three times daily with meals    Historical diabetic medications include:  metformin - stopped by endocrinology    Glucose Readings:  Patient has continuous glucose monitoring with Dexcom G7  He is currently taking a break from Dexcom due to alarms and fixating on numbers  He has been checking blood glucose with glucometer 3-4 times per day  He reports recent fasting readings in the high 100s mg/dL  He is aware of hypoglycemia symptoms and has PRN glucagon available    Lifestyle:  Diet: 3 meals/day plus evening snack  Tries to limit sugar, does not go out to eat  Breakfast: almond milk, breakfast sandwich, banana  Lunch: usually salad and bologna sandwich  Dinner: variable, pork chop or skinless chicken breast,  "rice  Evening snacks: chips, cereal  Physical Activity: walks to stores/pharmacy    Secondary Prevention:  Statin? No  ACE-I/ARB? No  Aspirin? No    Pertinent PMH Review:  PMH of Pancreatitis: Yes  PMH of Retinopathy: No  PMH of Urinary Tract Infections: No      Medication System Management  Patient's preferred pharmacy: [facility] in [place], [place] (walking distance for patient)  Adherence/Organization: patient denies missed doses of insulins  Affordability/Accessibility: cost concerns  Patient is on a fixed income due to disability and has difficulty paying for medications and diabetes testing supplies. He may qualify for  Patient Assistance Program, so will proceed with application once patient submits most recent tax documents.      Objective   Allergies   Allergen Reactions    Lithium Hallucinations     Pt gets \"crazy\"    Penicillins Rash     Social History     Social History Narrative    Not on file      Medication Review  Current Outpatient Medications   Medication Instructions    ALPRAZolam (XANAX) 1 mg    amphetamine-dextroamphetamine (Adderall) 15 mg tablet 15 mg, oral, Every evening    amphetamine-dextroamphetamine (Adderall) 30 mg tablet Take 1 tablet (30 mg) by mouth once daily.    Basaglar KwikPen U-100 Insulin 100 Units, subcutaneous, Daily    Dexcom G7  misc Use as instructed    Dexcom G7 Sensor device Apply one sensor under the skin as directed every 10 days.    divalproex (Depakote ER) 250 mg 24 hr tablet 1 tablet, oral, 2 times daily    esomeprazole magnesium (NEXIUM ORAL) Take by mouth.    glucagon (Baqsimi) 3 mg/actuation spray,non-aerosol 1 spray, nasal, As needed    insulin aspart (NovoLOG) 100 unit/mL (3 mL) pen Inject 20 units three times daily with meals    metoprolol tartrate (LOPRESSOR) 100 mg, oral, 2 times daily    OneTouch Ultra Test strip Use as directed 4 times daily to check blood glucose    pen needle, diabetic 32 gauge x 5/32\" needle Use with insulin 4 times daily as " "directed    risperiDONE (RisperDAL) 2 mg tablet     rosuvastatin (CRESTOR) 20 mg, oral, Daily    sildenafil (VIAGRA) 100 mg, oral, Daily PRN    zolpidem (Ambien) 10 mg tablet 1 tablet, Nightly PRN      Vitals  BP Readings from Last 2 Encounters:   10/24/24 140/90   08/16/23 (!) 160/100     BMI Readings from Last 1 Encounters:   10/24/24 35.61 kg/m²      Labs  A1C  Lab Results   Component Value Date    HGBA1C 11.7 (H) 10/24/2024    HGBA1C 12.9 (H) 02/11/2024    HGBA1C 11.6 (A) 08/16/2023     BMP  Lab Results   Component Value Date    CALCIUM 9.5 10/24/2024     (L) 10/24/2024    K 4.8 10/24/2024    CO2 20 (L) 10/24/2024    CL 99 10/24/2024    BUN 25 (H) 10/24/2024    CREATININE 1.23 10/24/2024    EGFR 53 (L) 10/24/2024     LFTs  Lab Results   Component Value Date    ALT 18 10/24/2024    AST 11 10/24/2024    ALKPHOS 128 (H) 10/24/2024    BILITOT 0.4 10/24/2024     FLP  Lab Results   Component Value Date    TRIG 459 (H) 10/24/2024    CHOL 211 (H) 10/24/2024    LDLF 95 05/16/2023    LDLCALC  10/24/2024      Comment:      The calculation of LDL and VLDL are inaccurate when the Triglycerides are greater than 400 mg/dL or when the patient is non-fasting. If LDL measurement is necessary contact the testing laboratory for an alternative LDL assay.                                  Near   Borderline      AGE      Desirable  Optimal    High     High     Very High     0-19 Y     0 - 109     ---    110-129   >/= 130     ----    20-24 Y     0 - 119     ---    120-159   >/= 160     ----      >24 Y     0 -  99   100-129  130-159   160-189     >/=190      HDL 36.8 10/24/2024     Urine Microalbumin  No results found for: \"MICROALBCREA\"  Weight Management  Wt Readings from Last 3 Encounters:   10/24/24 109 kg (239 lb 6.4 oz)   08/16/23 112 kg (248 lb)   05/16/23 112 kg (246 lb 12.8 oz)      There is no height or weight on file to calculate BMI.     Assessment/Plan   Problem List Items Addressed This Visit       Type 1 diabetes " mellitus with diabetic polyneuropathy    Relevant Medications    insulin aspart (NovoLOG) 100 unit/mL (3 mL) pen    Other Relevant Orders    Referral to Clinical Pharmacy       DISCUSSION/PLAN:  Patient with improved diabetes, not yet at goal - most recent A1c of 8.2% (goal < 7%) in February 2025. He is currently taking a break from his Dexcom G7 and is checking blood glucose 3-4 times per day. He was recently seen by endocrinology who stopped metformin, started rosuvastatin, and adjusted insulin. I agree that patient would benefit from starting an insulin pump. Congratulated him on significant improvement in A1c. Will continue to follow as patient is high risk for nonadherence, but defer further management to endocrinology.     No medication changes - defer to endocrinology for diabetes management    Monitoring and Education:  Counseled patient on mechanism of action, dosing, drug interactions, side effects, and monitoring of diabetes medications  Reviewed symptoms and treatment of hypoglycemia, including Baqsimi  Answered all patient questions    Continue all meds under the continuation of care with the referring provider and clinical pharmacy team.    Clinical Pharmacist follow-up: March 12th, 2025 at 10:20 AM  Orders placed: none at this time    Thank you,   Nohelia Quezada, PharmD  Clinical Pharmacy Specialist, Primary Care   [phone]    Verbal consent to manage patient's drug therapy was obtained from the patient . Patient was informed he  may decline to participate or withdraw from participation in pharmacy services at any time.

## 2025-03-12 ENCOUNTER — APPOINTMENT (OUTPATIENT)
Dept: PHARMACY | Facility: HOSPITAL | Age: 55
End: 2025-03-12
Payer: MEDICARE

## 2025-03-12 DIAGNOSIS — E10.42 TYPE 1 DIABETES MELLITUS WITH DIABETIC POLYNEUROPATHY: ICD-10-CM

## 2025-03-12 NOTE — PROGRESS NOTES
Clinical Pharmacy Appointment    Patient ID: Raul Cash is a 55 y.o. adult who presents for Diabetes.    Pt is here for Follow Up appointment.     Referring Provider: Jennifer Menezes DO  PCP: Jennifer Menezes DO   Last visit with PCP: 11/12/2024   Next visit with PCP: not yet scheduled    INTERVAL HISTORY   Patient's last appointment with clinical pharmacy team on 2/25/2025  Recent hospitalizations? No   Medication changes? No  Missed doses of medications? No  Side effects? No  Updated relevant labs? No  Changes to diet or exercise regimen? No      Subjective     HPI  DIABETES:    Diagnosed with diabetes: approximately 8-9 years ago  Due to unclear diagnosis (type 1 vs. type 2), patient recently underwent PANDA antibody testing which was positive, suspect DL  Known diabetic complications: none known  Does patient follow with Endocrinology: yes  Last visit: 2/20/2025  Next visit: 6/5/2025  Last eye exam: follows with eye doctor every 6 months     Current diabetic medications include:  Basaglar 100 units once daily at night  Novolog 20 units three times daily with meals and 5 units with evening snack    Historical diabetic medications include:  metformin - stopped by endocrinology    Glucose Readings:  Patient has continuous glucose monitoring with Dexcom G7  He is currently taking a break from Dexcom due to alarms and fixating on numbers  He has been checking blood glucose with glucometer 3-4 times per day  He reports recent fasting readings in the high 100s mg/dL  He is aware of hypoglycemia symptoms and has PRN glucagon available    Lifestyle:  Diet: 3 meals/day plus evening snack  Tries to limit sugar, does not go out to eat  Breakfast: almond milk, breakfast sandwich, banana  Lunch: usually salad and bologna sandwich  Dinner: variable, pork chop or skinless chicken breast, rice  Evening snacks: chips, cereal  Physical Activity: walks to stores/pharmacy    Secondary Prevention:  Statin? No  ACE-I/ARB?  "No  Aspirin? No    Pertinent PMH Review:  PMH of Pancreatitis: Yes  PMH of Retinopathy: No  PMH of Urinary Tract Infections: No      Medication System Management  Patient's preferred pharmacy: [facility] in [place], [place] (walking distance for patient)  Adherence/Organization: patient denies missed doses of insulins  Affordability/Accessibility: cost concerns  Patient is on a fixed income due to disability and has difficulty paying for medications and diabetes testing supplies. He may qualify for  Patient Assistance Program, so will proceed with application once patient submits most recent tax documents.      Objective   Allergies   Allergen Reactions    Lithium Hallucinations     Pt gets \"crazy\"    Penicillins Rash     Social History     Social History Narrative    Not on file      Medication Review  Current Outpatient Medications   Medication Instructions    ALPRAZolam (XANAX) 1 mg    amphetamine-dextroamphetamine (Adderall) 15 mg tablet 15 mg, oral, Every evening    amphetamine-dextroamphetamine (Adderall) 30 mg tablet Take 1 tablet (30 mg) by mouth once daily.    Basaglar KwikPen U-100 Insulin 100 Units, subcutaneous, Daily    Dexcom G7  misc Use as instructed    Dexcom G7 Sensor device Apply one sensor under the skin as directed every 10 days.    divalproex (Depakote ER) 250 mg 24 hr tablet 1 tablet, oral, 2 times daily    esomeprazole magnesium (NEXIUM ORAL) Take by mouth.    glucagon (Baqsimi) 3 mg/actuation spray,non-aerosol 1 spray, nasal, As needed    insulin aspart (NovoLOG) 100 unit/mL (3 mL) pen Inject 20 units three times daily with meals    metoprolol tartrate (LOPRESSOR) 100 mg, oral, 2 times daily    OneTouch Ultra Test strip Use as directed 4 times daily to check blood glucose    pen needle, diabetic 32 gauge x 5/32\" needle Use with insulin 4 times daily as directed    risperiDONE (RisperDAL) 2 mg tablet     rosuvastatin (CRESTOR) 20 mg, oral, Daily    sildenafil (VIAGRA) 100 mg, oral, " "Daily PRN    zolpidem (Ambien) 10 mg tablet 1 tablet, Nightly PRN      Vitals  BP Readings from Last 2 Encounters:   10/24/24 140/90   08/16/23 (!) 160/100     BMI Readings from Last 1 Encounters:   10/24/24 35.61 kg/m²      Labs  A1C  Lab Results   Component Value Date    HGBA1C 11.7 (H) 10/24/2024    HGBA1C 12.9 (H) 02/11/2024    HGBA1C 11.6 (A) 08/16/2023     BMP  Lab Results   Component Value Date    CALCIUM 9.5 10/24/2024     (L) 10/24/2024    K 4.8 10/24/2024    CO2 20 (L) 10/24/2024    CL 99 10/24/2024    BUN 25 (H) 10/24/2024    CREATININE 1.23 10/24/2024    EGFR 53 (L) 10/24/2024     LFTs  Lab Results   Component Value Date    ALT 18 10/24/2024    AST 11 10/24/2024    ALKPHOS 128 (H) 10/24/2024    BILITOT 0.4 10/24/2024     FLP  Lab Results   Component Value Date    TRIG 459 (H) 10/24/2024    CHOL 211 (H) 10/24/2024    LDLF 95 05/16/2023    LDLCALC  10/24/2024      Comment:      The calculation of LDL and VLDL are inaccurate when the Triglycerides are greater than 400 mg/dL or when the patient is non-fasting. If LDL measurement is necessary contact the testing laboratory for an alternative LDL assay.                                  Near   Borderline      AGE      Desirable  Optimal    High     High     Very High     0-19 Y     0 - 109     ---    110-129   >/= 130     ----    20-24 Y     0 - 119     ---    120-159   >/= 160     ----      >24 Y     0 -  99   100-129  130-159   160-189     >/=190      HDL 36.8 10/24/2024     Urine Microalbumin  No results found for: \"MICROALBCREA\"  Weight Management  Wt Readings from Last 3 Encounters:   10/24/24 109 kg (239 lb 6.4 oz)   08/16/23 112 kg (248 lb)   05/16/23 112 kg (246 lb 12.8 oz)      There is no height or weight on file to calculate BMI.     Assessment/Plan   Problem List Items Addressed This Visit       Type 1 diabetes mellitus with diabetic polyneuropathy    Relevant Orders    Referral to Clinical Pharmacy       DISCUSSION/PLAN:  Patient with " improved diabetes, not yet at goal - most recent A1c of 8.2% (goal < 7%) in February 2025. He is currently taking a break from his Dexcom G7 and is checking blood glucose 3-4 times per day with recent readings ranging from 150-250 mg/dL. Patient reports one recent symptomatic episode of hypoglycemia - treated appropriately with glucose tablets. He is now established with endocrinology. Will continue regular follow up as patient is high risk for nonadherence.    No medication changes - defer to endocrinology for diabetes management    Monitoring and Education:  Counseled patient on mechanism of action, dosing, drug interactions, side effects, and monitoring of diabetes medications  Reviewed symptoms and treatment of hypoglycemia, including Baqsimi  Answered all patient questions    Continue all meds under the continuation of care with the referring provider and clinical pharmacy team.    Clinical Pharmacist follow-up: April 2nd, 2025 at 11:20 AM  Orders placed: none at this time    Thank you,   Nohelia Quezada, PharmD  Clinical Pharmacy Specialist, Primary Care   [phone]    Verbal consent to manage patient's drug therapy was obtained from the patient . Patient was informed he  may decline to participate or withdraw from participation in pharmacy services at any time.

## 2025-04-02 ENCOUNTER — APPOINTMENT (OUTPATIENT)
Dept: PHARMACY | Facility: HOSPITAL | Age: 55
End: 2025-04-02
Payer: MEDICARE

## 2025-04-02 DIAGNOSIS — E10.42 TYPE 1 DIABETES MELLITUS WITH DIABETIC POLYNEUROPATHY: ICD-10-CM

## 2025-04-02 NOTE — PROGRESS NOTES
Clinical Pharmacy Appointment    Patient ID: Raul Cash is a 55 y.o. adult who presents for Diabetes.    Pt is here for Follow Up appointment.     Referring Provider: Jennifer Menezes DO  PCP: Jennifer Menzees DO   Last visit with PCP: 11/12/2024   Next visit with PCP: not yet scheduled    INTERVAL HISTORY   Patient's last appointment with clinical pharmacy team on 3/12/2025  Recent hospitalizations? No   Medication changes? No  Missed doses of medications? Yes  Patient endorses occasional missed doses of insulins  Side effects? No  Updated relevant labs? No  Changes to diet or exercise regimen? No      Subjective     HPI  DIABETES:    Diagnosed with diabetes: approximately 8-9 years ago  Due to unclear diagnosis (type 1 vs. type 2), patient recently underwent PANDA antibody testing which was positive, suspect DL  Known diabetic complications: none known  Does patient follow with Endocrinology: yes  Last visit: 2/20/2025  Next visit: 6/5/2025  Last eye exam: follows with eye doctor every 6 months     Current diabetic medications include:  Basaglar 100 units once daily at night  Novolog 20 units three times daily with meals and 5 units with evening snack    Historical diabetic medications include:  metformin - stopped by endocrinology    Glucose Readings:  Patient has continuous glucose monitoring with Dexcom G7  He is currently taking a break from Dexcom due to alarms and fixating on numbers  He has been checking blood glucose with glucometer 3-4 times per day  He reports recent fasting levels ranging from  mg/dL  He is aware of hypoglycemia symptoms and has PRN glucagon available    Lifestyle:  Diet: 3 meals/day plus evening snack  Tries to limit sugar, does not go out to eat  Breakfast: almond milk, breakfast sandwich, banana  Lunch: usually salad and bologna sandwich  Dinner: variable, pork chop or skinless chicken breast, rice  Evening snacks: chips, cereal  Physical Activity: walks to  "stores/pharmacy    Secondary Prevention:  Statin? No  ACE-I/ARB? No  Aspirin? No    Pertinent PMH Review:  PMH of Pancreatitis: Yes  PMH of Retinopathy: No  PMH of Urinary Tract Infections: No      Medication System Management  Patient's preferred pharmacy: [facility] in [place], [place] (walking distance for patient)  Adherence/Organization: patient denies missed doses of insulins  Affordability/Accessibility: cost concerns  Patient is on a fixed income due to disability and has difficulty paying for medications and diabetes testing supplies. He may qualify for  Patient Assistance Program, so will proceed with application once patient submits most recent tax documents.      Objective   Allergies   Allergen Reactions    Lithium Hallucinations     Pt gets \"crazy\"    Penicillins Rash     Social History     Social History Narrative    Not on file      Medication Review  Current Outpatient Medications   Medication Instructions    ALPRAZolam (XANAX) 1 mg    amphetamine-dextroamphetamine (Adderall) 15 mg tablet 15 mg, oral, Every evening    amphetamine-dextroamphetamine (Adderall) 30 mg tablet Take 1 tablet (30 mg) by mouth once daily.    Basaglar KwikPen U-100 Insulin 100 Units, subcutaneous, Daily    Dexcom G7  misc Use as instructed    Dexcom G7 Sensor device Apply one sensor under the skin as directed every 10 days.    divalproex (Depakote ER) 250 mg 24 hr tablet 1 tablet, oral, 2 times daily    esomeprazole magnesium (NEXIUM ORAL) Take by mouth.    glucagon (Baqsimi) 3 mg/actuation spray,non-aerosol 1 spray, nasal, As needed    insulin aspart (NovoLOG) 100 unit/mL (3 mL) pen Inject 20 units three times daily with meals    metoprolol tartrate (LOPRESSOR) 100 mg, oral, 2 times daily    OneTouch Ultra Test strip Use as directed 4 times daily to check blood glucose    pen needle, diabetic 32 gauge x 5/32\" needle Use with insulin 4 times daily as directed    risperiDONE (RisperDAL) 2 mg tablet     rosuvastatin " "(CRESTOR) 20 mg, oral, Daily    sildenafil (VIAGRA) 100 mg, oral, Daily PRN    zolpidem (Ambien) 10 mg tablet 1 tablet, Nightly PRN      Vitals  BP Readings from Last 2 Encounters:   10/24/24 140/90   08/16/23 (!) 160/100     BMI Readings from Last 1 Encounters:   10/24/24 35.61 kg/m²      Labs  A1C  Lab Results   Component Value Date    HGBA1C 11.7 (H) 10/24/2024    HGBA1C 12.9 (H) 02/11/2024    HGBA1C 11.6 (A) 08/16/2023     BMP  Lab Results   Component Value Date    CALCIUM 9.5 10/24/2024     (L) 10/24/2024    K 4.8 10/24/2024    CO2 20 (L) 10/24/2024    CL 99 10/24/2024    BUN 25 (H) 10/24/2024    CREATININE 1.23 10/24/2024    EGFR 53 (L) 10/24/2024     LFTs  Lab Results   Component Value Date    ALT 18 10/24/2024    AST 11 10/24/2024    ALKPHOS 128 (H) 10/24/2024    BILITOT 0.4 10/24/2024     FLP  Lab Results   Component Value Date    TRIG 459 (H) 10/24/2024    CHOL 211 (H) 10/24/2024    LDLF 95 05/16/2023    LDLCALC  10/24/2024      Comment:      The calculation of LDL and VLDL are inaccurate when the Triglycerides are greater than 400 mg/dL or when the patient is non-fasting. If LDL measurement is necessary contact the testing laboratory for an alternative LDL assay.                                  Near   Borderline      AGE      Desirable  Optimal    High     High     Very High     0-19 Y     0 - 109     ---    110-129   >/= 130     ----    20-24 Y     0 - 119     ---    120-159   >/= 160     ----      >24 Y     0 -  99   100-129  130-159   160-189     >/=190      HDL 36.8 10/24/2024     Urine Microalbumin  No results found for: \"MICROALBCREA\"  Weight Management  Wt Readings from Last 3 Encounters:   10/24/24 109 kg (239 lb 6.4 oz)   08/16/23 112 kg (248 lb)   05/16/23 112 kg (246 lb 12.8 oz)      There is no height or weight on file to calculate BMI.     Assessment/Plan   Problem List Items Addressed This Visit       Type 1 diabetes mellitus with diabetic polyneuropathy    Relevant Orders    Referral " to Clinical Pharmacy       DISCUSSION/PLAN:  Patient with improved diabetes, not yet at goal - most recent A1c of 8.2% (goal < 7%) in February 2025. He endorses occasional missed doses of insulin but is doing well with current regimen. Patient reports fasting blood glucose levels around  mg/dL with no recent symptoms of hypoglycemia. He has PRN glucagon available. No changes to diabetes regimen as he is established with endocrinology but will continue follow up every 3-4 weeks as patient is at high risk for nonadherence.    No medication changes - defer to endocrinology for diabetes management    Monitoring and Education:  Counseled patient on mechanism of action, dosing, drug interactions, side effects, and monitoring of diabetes medications  Reviewed symptoms and treatment of hypoglycemia, including Baqsimi  Answered all patient questions    Continue all meds under the continuation of care with the referring provider and clinical pharmacy team.    Clinical Pharmacist follow-up: April 29th, 2025 at 8:40 AM  Orders placed: none at this time    Thank you,   Nohelia Quezada, PharmD  Clinical Pharmacy Specialist, Primary Care   [phone]    Verbal consent to manage patient's drug therapy was obtained from the patient . Patient was informed he  may decline to participate or withdraw from participation in pharmacy services at any time.

## 2025-04-29 ENCOUNTER — APPOINTMENT (OUTPATIENT)
Dept: PHARMACY | Facility: HOSPITAL | Age: 55
End: 2025-04-29
Payer: MEDICARE

## 2025-04-29 DIAGNOSIS — E10.42 TYPE 1 DIABETES MELLITUS WITH DIABETIC POLYNEUROPATHY: ICD-10-CM

## 2025-04-29 NOTE — PROGRESS NOTES
Clinical Pharmacy Appointment    Patient ID: Raul Cash is a 55 y.o. adult who presents for Diabetes.    Pt is here for Follow Up appointment.     Referring Provider: Jennifer Menezes DO  PCP: Jennifer Menezes DO   Last visit with PCP: 11/12/2024   Next visit with PCP: not yet scheduled    INTERVAL HISTORY   Patient's last appointment with clinical pharmacy team on 4/2/2025  Recent hospitalizations? No   Medication changes? No  Missed doses of medications? Yes  Patient endorses occasional missed doses of insulins  Side effects? No  Updated relevant labs? No  Changes to diet or exercise regimen? Yes  He reports less ideal food choices recently      Subjective     HPI  DIABETES:    Diagnosed with diabetes: approximately 8-9 years ago  Due to unclear diagnosis (type 1 vs. type 2), patient recently underwent PANDA antibody testing which was positive, suspect DL  Known diabetic complications: none known  Does patient follow with Endocrinology: yes  Last visit: 2/20/2025  Next visit: 6/5/2025  Last eye exam: follows with eye doctor every 6 months     Current diabetic medications include:  Basaglar 100 units once daily at night  Novolog 20 units three times daily with meals and 5 units with evening snack    Historical diabetic medications include:  metformin - stopped by endocrinology    Glucose Readings:  Patient has continuous glucose monitoring with Dexcom G7  He is currently taking a break from Dexcom due to alarms and fixating on numbers  He has been checking blood glucose with glucometer 3-4 times per day  He reports recent fasting levels have been in the high 100s mg/dL  He is aware of hypoglycemia symptoms and has PRN glucagon available    Lifestyle:  Diet: 3 meals/day plus evening snack  Tries to limit sugar, does not go out to eat  Breakfast: almond milk, breakfast sandwich, banana  Lunch: usually salad and bologna sandwich  Dinner: variable, pork chop or skinless chicken breast, rice  Evening snacks: chips,  "cereal  Physical Activity: walks to stores/pharmacy    Secondary Prevention:  Statin? No  ACE-I/ARB? No  Aspirin? No    Pertinent PMH Review:  PMH of Pancreatitis: Yes  PMH of Retinopathy: No  PMH of Urinary Tract Infections: No      Medication System Management  Patient's preferred pharmacy: [facility] in [place], [place] (walking distance for patient)  Adherence/Organization: patient denies missed doses of insulins  Affordability/Accessibility: cost concerns  Patient is on a fixed income due to disability and has difficulty paying for medications and diabetes testing supplies. He may qualify for  Patient Assistance Program, so will proceed with application once patient submits most recent tax documents.      Objective   Allergies   Allergen Reactions    Lithium Hallucinations     Pt gets \"crazy\"    Penicillins Rash     Social History     Social History Narrative    Not on file      Medication Review  Current Outpatient Medications   Medication Instructions    ALPRAZolam (XANAX) 1 mg    amphetamine-dextroamphetamine (Adderall) 15 mg tablet 15 mg, oral, Every evening    amphetamine-dextroamphetamine (Adderall) 30 mg tablet Take 1 tablet (30 mg) by mouth once daily.    Basaglar KwikPen U-100 Insulin 100 Units, subcutaneous, Daily    Dexcom G7  misc Use as instructed    Dexcom G7 Sensor device Apply one sensor under the skin as directed every 10 days.    divalproex (Depakote ER) 250 mg 24 hr tablet 1 tablet, oral, 2 times daily    esomeprazole magnesium (NEXIUM ORAL) Take by mouth.    glucagon (Baqsimi) 3 mg/actuation spray,non-aerosol 1 spray, nasal, As needed    insulin aspart (NovoLOG) 100 unit/mL (3 mL) pen Inject 20 units three times daily with meals    metoprolol tartrate (LOPRESSOR) 100 mg, oral, 2 times daily    OneTouch Ultra Test strip Use as directed 4 times daily to check blood glucose    pen needle, diabetic 32 gauge x 5/32\" needle Use with insulin 4 times daily as directed    risperiDONE " "(RisperDAL) 2 mg tablet     rosuvastatin (CRESTOR) 20 mg, oral, Daily    sildenafil (VIAGRA) 100 mg, oral, Daily PRN    zolpidem (Ambien) 10 mg tablet 1 tablet, Nightly PRN      Vitals  BP Readings from Last 2 Encounters:   10/24/24 140/90   08/16/23 (!) 160/100     BMI Readings from Last 1 Encounters:   10/24/24 35.61 kg/m²      Labs  A1C  Lab Results   Component Value Date    HGBA1C 11.7 (H) 10/24/2024    HGBA1C 12.9 (H) 02/11/2024    HGBA1C 11.6 (A) 08/16/2023     BMP  Lab Results   Component Value Date    CALCIUM 9.5 10/24/2024     (L) 10/24/2024    K 4.8 10/24/2024    CO2 20 (L) 10/24/2024    CL 99 10/24/2024    BUN 25 (H) 10/24/2024    CREATININE 1.23 10/24/2024    EGFR 53 (L) 10/24/2024     LFTs  Lab Results   Component Value Date    ALT 18 10/24/2024    AST 11 10/24/2024    ALKPHOS 128 (H) 10/24/2024    BILITOT 0.4 10/24/2024     FLP  Lab Results   Component Value Date    TRIG 459 (H) 10/24/2024    CHOL 211 (H) 10/24/2024    LDLF 95 05/16/2023    LDLCALC  10/24/2024      Comment:      The calculation of LDL and VLDL are inaccurate when the Triglycerides are greater than 400 mg/dL or when the patient is non-fasting. If LDL measurement is necessary contact the testing laboratory for an alternative LDL assay.                                  Near   Borderline      AGE      Desirable  Optimal    High     High     Very High     0-19 Y     0 - 109     ---    110-129   >/= 130     ----    20-24 Y     0 - 119     ---    120-159   >/= 160     ----      >24 Y     0 -  99   100-129  130-159   160-189     >/=190      HDL 36.8 10/24/2024     Urine Microalbumin  No results found for: \"MICROALBCREA\"  Weight Management  Wt Readings from Last 3 Encounters:   10/24/24 109 kg (239 lb 6.4 oz)   08/16/23 112 kg (248 lb)   05/16/23 112 kg (246 lb 12.8 oz)      There is no height or weight on file to calculate BMI.     Assessment/Plan   Problem List Items Addressed This Visit       Type 1 diabetes mellitus with diabetic " polyneuropathy    Relevant Orders    Referral to Clinical Pharmacy       DISCUSSION/PLAN:  Patient with improved diabetes, not yet at goal - most recent A1c of 8.2% (goal < 7%) in February 2025. He endorses occasional missed doses of insulin and less ideal diet choices recently - recommendations provided. Fasting blood glucose levels have been in the high 100s mg/dL. He is aware of hypoglycemia symptoms and treats with glucose tablets if needed. He also has PRN glucagon available. No changes to diabetes regimen as he is established with endocrinology but will continue follow up every 3-4 weeks as patient is at high risk for nonadherence.    No medication changes - defer to endocrinology for diabetes management    Monitoring and Education:  Counseled patient on mechanism of action, dosing, drug interactions, side effects, and monitoring of diabetes medications  Reviewed symptoms and treatment of hypoglycemia, including Baqsimi  Answered all patient questions    Continue all meds under the continuation of care with the referring provider and clinical pharmacy team.    Clinical Pharmacist follow-up: May 20th, 2025 at 9:40 AM  Orders placed: none at this time    Thank you,   Nohelia Quezada, PharmD  Clinical Pharmacy Specialist, Primary Care   [phone]    Verbal consent to manage patient's drug therapy was obtained from the patient . Patient was informed he  may decline to participate or withdraw from participation in pharmacy services at any time.

## 2025-05-20 ENCOUNTER — APPOINTMENT (OUTPATIENT)
Dept: PHARMACY | Facility: HOSPITAL | Age: 55
End: 2025-05-20
Payer: MEDICARE

## 2025-05-20 DIAGNOSIS — E10.42 TYPE 1 DIABETES MELLITUS WITH DIABETIC POLYNEUROPATHY: ICD-10-CM

## 2025-05-20 NOTE — PROGRESS NOTES
Clinical Pharmacy Appointment    Patient ID: Raul Cash is a 55 y.o. adult who presents for Diabetes.    Pt is here for Follow Up appointment.     Referring Provider: Jennifer Menezes DO  PCP: Jennifer Menezes DO   Last visit with PCP: 11/12/2024   Next visit with PCP: not yet scheduled    INTERVAL HISTORY   Patient's last appointment with clinical pharmacy team on 4/29/2025  Recent hospitalizations? No   Medication changes? Yes  Increased risperidone dose per psych  Missed doses of medications? No  Side effects? No  Updated relevant labs? No  Changes to diet or exercise regimen? Yes  He reports less ideal food choices recently    Of note, patient reports a bad bout of depression recently due to financial and personal stress in his relationship. He briefly considered drinking alcohol, but reached out to psych and has been feeling better the last few days. He denies suicidal ideation and is aware of crisis hotline and other resources.    Subjective     HPI  DIABETES:    Diagnosed with diabetes: approximately 8-9 years ago  Due to unclear diagnosis (type 1 vs. type 2), patient recently underwent PANDA antibody testing which was positive, suspect DL  Known diabetic complications: none known  Does patient follow with Endocrinology: yes  Last visit: 2/20/2025  Next visit: 6/5/2025  Last eye exam: follows with eye doctor every 6 months     Current diabetic medications include:  Basaglar 100 units once daily at night  Novolog 20 units three times daily with meals and 5 units with evening snack    Historical diabetic medications include:  metformin - stopped by endocrinology    Glucose Readings:  Patient has continuous glucose monitoring with Dexcom G7  He is currently taking a break from Dexcom due to alarms and fixating on numbers  He has been checking blood glucose with glucometer 3-4 times per day  He reports recent fasting levels have been in the high 100s mg/dL  He is aware of hypoglycemia symptoms and has PRN  "glucagon available    Lifestyle:  Diet: 3 meals/day plus evening snack  Tries to limit sugar, does not go out to eat  Breakfast: almond milk, breakfast sandwich, banana  Lunch: usually salad and bologna sandwich  Dinner: variable, pork chop or skinless chicken breast, rice  Evening snacks: chips, cereal  Physical Activity: walks to stores/pharmacy    Secondary Prevention:  Statin? No  ACE-I/ARB? No  Aspirin? No    Pertinent PMH Review:  PMH of Pancreatitis: Yes  PMH of Retinopathy: No  PMH of Urinary Tract Infections: No      Medication System Management  Patient's preferred pharmacy: [facility] in [place], [place] (walking distance for patient)  Adherence/Organization: patient denies missed doses of insulins  Affordability/Accessibility: cost concerns  Patient is on a fixed income due to disability and has difficulty paying for medications and diabetes testing supplies. He may qualify for  Patient Assistance Program, so will proceed with application once patient submits most recent tax documents.      Objective   Allergies   Allergen Reactions    Lithium Hallucinations     Pt gets \"crazy\"    Penicillins Rash     Social History     Social History Narrative    Not on file      Medication Review  Current Outpatient Medications   Medication Instructions    ALPRAZolam (XANAX) 1 mg    amphetamine-dextroamphetamine (Adderall) 15 mg tablet 15 mg, oral, Every evening    amphetamine-dextroamphetamine (Adderall) 30 mg tablet Take 1 tablet (30 mg) by mouth once daily.    Basaglar KwikPen U-100 Insulin 100 Units, subcutaneous, Daily    Dexcom G7  misc Use as instructed    Dexcom G7 Sensor device Apply one sensor under the skin as directed every 10 days.    divalproex (Depakote ER) 250 mg 24 hr tablet 1 tablet, oral, 2 times daily    esomeprazole magnesium (NEXIUM ORAL) Take by mouth.    glucagon (Baqsimi) 3 mg/actuation spray,non-aerosol 1 spray, nasal, As needed    insulin aspart (NovoLOG) 100 unit/mL (3 mL) pen Inject " "20 units three times daily with meals    metoprolol tartrate (LOPRESSOR) 100 mg, oral, 2 times daily    OneTouch Ultra Test strip Use as directed 4 times daily to check blood glucose    pen needle, diabetic 32 gauge x 5/32\" needle Use with insulin 4 times daily as directed    risperiDONE (RisperDAL) 2 mg tablet     rosuvastatin (CRESTOR) 20 mg, oral, Daily    sildenafil (VIAGRA) 100 mg, oral, Daily PRN    zolpidem (Ambien) 10 mg tablet 1 tablet, Nightly PRN      Vitals  BP Readings from Last 2 Encounters:   10/24/24 140/90   08/16/23 (!) 160/100     BMI Readings from Last 1 Encounters:   10/24/24 35.61 kg/m²      Labs  A1C  Lab Results   Component Value Date    HGBA1C 11.7 (H) 10/24/2024    HGBA1C 12.9 (H) 02/11/2024    HGBA1C 11.6 (A) 08/16/2023     BMP  Lab Results   Component Value Date    CALCIUM 9.5 10/24/2024     (L) 10/24/2024    K 4.8 10/24/2024    CO2 20 (L) 10/24/2024    CL 99 10/24/2024    BUN 25 (H) 10/24/2024    CREATININE 1.23 10/24/2024    EGFR 53 (L) 10/24/2024     LFTs  Lab Results   Component Value Date    ALT 18 10/24/2024    AST 11 10/24/2024    ALKPHOS 128 (H) 10/24/2024    BILITOT 0.4 10/24/2024     FLP  Lab Results   Component Value Date    TRIG 459 (H) 10/24/2024    CHOL 211 (H) 10/24/2024    LDLF 95 05/16/2023    LDLCALC  10/24/2024      Comment:      The calculation of LDL and VLDL are inaccurate when the Triglycerides are greater than 400 mg/dL or when the patient is non-fasting. If LDL measurement is necessary contact the testing laboratory for an alternative LDL assay.                                  Near   Borderline      AGE      Desirable  Optimal    High     High     Very High     0-19 Y     0 - 109     ---    110-129   >/= 130     ----    20-24 Y     0 - 119     ---    120-159   >/= 160     ----      >24 Y     0 -  99   100-129  130-159   160-189     >/=190      HDL 36.8 10/24/2024     Urine Microalbumin  No results found for: \"MICROALBCREA\"  Weight Management  Wt Readings " from Last 3 Encounters:   10/24/24 109 kg (239 lb 6.4 oz)   08/16/23 112 kg (248 lb)   05/16/23 112 kg (246 lb 12.8 oz)      There is no height or weight on file to calculate BMI.     Assessment/Plan   Problem List Items Addressed This Visit       Type 1 diabetes mellitus with diabetic polyneuropathy    Relevant Orders    Referral to Clinical Pharmacy       DISCUSSION/PLAN:  Patient with improved diabetes, not yet at goal - most recent A1c of 8.2% (goal < 7%) in February 2025. He denies side effects, missed doses, or symptoms of hypoglycemia at this time. Patient continues to struggle with food choices and late night snacking. Fasting blood glucose levels are above target - high 100s mg/dL. No changes to diabetes regimen as he is established with endocrinology but will continue to follow every 3-4 weeks as patient is at high risk for nonadherence.     No medication changes - defer to endocrinology for diabetes management    Monitoring and Education:  Counseled patient on mechanism of action, dosing, drug interactions, side effects, and monitoring of diabetes medications  Reviewed symptoms and treatment of hypoglycemia, including Baqsimi  Answered all patient questions    Continue all meds under the continuation of care with the referring provider and clinical pharmacy team.    Clinical Pharmacist follow-up: June 17th, 2025 at 9:20 AM  Orders placed: none at this time    Thank you,   Nohelia Quezada, PharmD  Clinical Pharmacy Specialist, Primary Care   [phone]    Verbal consent to manage patient's drug therapy was obtained from the patient . Patient was informed he  may decline to participate or withdraw from participation in pharmacy services at any time.

## 2025-06-05 ENCOUNTER — OFFICE VISIT (OUTPATIENT)
Dept: ENDOCRINOLOGY | Age: 55
End: 2025-06-05
Payer: COMMERCIAL

## 2025-06-05 DIAGNOSIS — E78.2 MIXED HYPERLIPIDEMIA: ICD-10-CM

## 2025-06-05 DIAGNOSIS — E66.09 CLASS 2 OBESITY DUE TO EXCESS CALORIES WITHOUT SERIOUS COMORBIDITY WITH BODY MASS INDEX (BMI) OF 37.0 TO 37.9 IN ADULT: ICD-10-CM

## 2025-06-05 DIAGNOSIS — E10.65 TYPE 1 DIABETES MELLITUS WITH HYPERGLYCEMIA (HCC): Primary | ICD-10-CM

## 2025-06-05 DIAGNOSIS — E66.812 CLASS 2 OBESITY DUE TO EXCESS CALORIES WITHOUT SERIOUS COMORBIDITY WITH BODY MASS INDEX (BMI) OF 37.0 TO 37.9 IN ADULT: ICD-10-CM

## 2025-06-05 LAB — HBA1C MFR BLD: 9.5 %

## 2025-06-05 PROCEDURE — 99214 OFFICE O/P EST MOD 30 MIN: CPT | Performed by: NURSE PRACTITIONER

## 2025-06-05 PROCEDURE — 83036 HEMOGLOBIN GLYCOSYLATED A1C: CPT | Performed by: NURSE PRACTITIONER

## 2025-06-05 PROCEDURE — 3046F HEMOGLOBIN A1C LEVEL >9.0%: CPT | Performed by: NURSE PRACTITIONER

## 2025-06-05 RX ORDER — INSULIN GLARGINE 100 [IU]/ML
INJECTION, SOLUTION SUBCUTANEOUS
Qty: 15 ADJUSTABLE DOSE PRE-FILLED PEN SYRINGE | Refills: 5 | Status: SHIPPED
Start: 2025-06-05

## 2025-06-05 RX ORDER — INSULIN ASPART 100 [IU]/ML
INJECTION, SOLUTION INTRAVENOUS; SUBCUTANEOUS
Qty: 10 ADJUSTABLE DOSE PRE-FILLED PEN SYRINGE | Refills: 5 | Status: SHIPPED
Start: 2025-06-05

## 2025-06-05 NOTE — PATIENT INSTRUCTIONS
Increase Lantus 66 units at night  Adjust NovoLog 20/22/22  units 3 times daily with meals  Continue  snack dose  5-10 units

## 2025-06-17 ENCOUNTER — APPOINTMENT (OUTPATIENT)
Dept: PHARMACY | Facility: HOSPITAL | Age: 55
End: 2025-06-17
Payer: MEDICARE

## 2025-06-17 DIAGNOSIS — E10.42 TYPE 1 DIABETES MELLITUS WITH DIABETIC POLYNEUROPATHY: ICD-10-CM

## 2025-06-17 NOTE — PROGRESS NOTES
Clinical Pharmacy Appointment    Patient ID: Raul Cash is a 55 y.o. adult who presents for Diabetes.    Pt is here for Follow Up appointment.     Referring Provider: Jennifer Menezes DO  PCP: Jennifer Menezes DO   Last visit with PCP: 11/12/2024   Next visit with PCP: not yet scheduled    INTERVAL HISTORY   Patient's last appointment with clinical pharmacy team on 5/20/2025  Recent hospitalizations? No   Medication changes? Yes  Insulin dose adjustments per endocrinology: insulin glargine changed to 70 units nightly and insulin aspart changed to 21 units with meals and 5-10 units with evening snacks  Missed doses of medications? Yes  Occasional missed doses of insulin  Side effects? No  Updated relevant labs? Yes  A1c increased to 9.5% (previously 8.2%)  Changes to diet or exercise regimen? No      Subjective     HPI  DIABETES:    Diagnosed with diabetes: approximately 8-9 years ago  Due to unclear diagnosis (type 1 vs. type 2), patient recently underwent PANDA antibody testing which was positive, suspect DL  Known diabetic complications: none known  Does patient follow with Endocrinology: yes  Last visit: 6/5/2025  Next visit: 10/7/2025  Last eye exam: follows regularly     Current diabetic medications include:  Basaglar 70 units once daily at night  Novolog 21 units three times daily with meals and 5-10 units with evening snack    Historical diabetic medications include:  metformin - stopped by endocrinology    Glucose Readings:  Patient is checking blood glucose 3-4 times per day with glucometer  He previously used Dexcom but has concerns with data sharing and skin reactions  He reports recent fasting levels have been around 250 mg/dL  He is aware of hypoglycemia symptoms and has PRN glucagon available    Lifestyle:  Diet: 3 meals/day plus evening snack  Tries to limit sugar, does not go out to eat  Breakfast: almond milk, breakfast sandwich, banana  Lunch: usually salad and bologna sandwich  Dinner:  "variable, pork chop or skinless chicken breast, rice  Evening snacks: chips, cereal  Physical Activity: walks to stores/pharmacy    Secondary Prevention:  Statin? No  ACE-I/ARB? No  Aspirin? No    Pertinent PMH Review:  PMH of Pancreatitis: Yes  PMH of Retinopathy: No  PMH of Urinary Tract Infections: No      Medication System Management  Patient's preferred pharmacy: [facility] in [place], [place] (walking distance for patient)  Adherence/Organization: patient denies missed doses of insulins  Affordability/Accessibility: cost concerns  Patient is on a fixed income due to disability and has difficulty paying for medications and diabetes testing supplies. He may qualify for  Patient Assistance Program, so will proceed with application once patient submits most recent tax documents.      Objective   Allergies   Allergen Reactions    Lithium Hallucinations     Pt gets \"crazy\"    Penicillins Rash     Social History     Social History Narrative    Not on file      Medication Review  Current Outpatient Medications   Medication Instructions    ALPRAZolam (XANAX) 1 mg    amphetamine-dextroamphetamine (Adderall) 15 mg tablet 15 mg, oral, Every evening    amphetamine-dextroamphetamine (Adderall) 30 mg tablet Take 1 tablet (30 mg) by mouth once daily.    Basaglar KwikPen U-100 Insulin 100 Units, subcutaneous, Daily    Dexcom G7  misc Use as instructed    Dexcom G7 Sensor device Apply one sensor under the skin as directed every 10 days.    divalproex (Depakote ER) 250 mg 24 hr tablet 1 tablet, oral, 2 times daily    esomeprazole magnesium (NEXIUM ORAL) Take by mouth.    glucagon (Baqsimi) 3 mg/actuation spray,non-aerosol 1 spray, nasal, As needed    insulin aspart (NovoLOG) 100 unit/mL (3 mL) pen Inject 20 units three times daily with meals    metoprolol tartrate (LOPRESSOR) 100 mg, oral, 2 times daily    OneTouch Ultra Test strip Use as directed 4 times daily to check blood glucose    pen needle, diabetic 32 gauge x " "5/32\" needle Use with insulin 4 times daily as directed    risperiDONE (RisperDAL) 2 mg tablet     rosuvastatin (CRESTOR) 20 mg, oral, Daily    sildenafil (VIAGRA) 100 mg, oral, Daily PRN    zolpidem (Ambien) 10 mg tablet 1 tablet, Nightly PRN      Vitals  BP Readings from Last 2 Encounters:   10/24/24 140/90   08/16/23 (!) 160/100     BMI Readings from Last 1 Encounters:   10/24/24 35.61 kg/m²      Labs  A1C  Lab Results   Component Value Date    HGBA1C 11.7 (H) 10/24/2024    HGBA1C 12.9 (H) 02/11/2024    HGBA1C 11.6 (A) 08/16/2023     BMP  Lab Results   Component Value Date    CALCIUM 9.5 10/24/2024     (L) 10/24/2024    K 4.8 10/24/2024    CO2 20 (L) 10/24/2024    CL 99 10/24/2024    BUN 25 (H) 10/24/2024    CREATININE 1.23 10/24/2024    EGFR 53 (L) 10/24/2024     LFTs  Lab Results   Component Value Date    ALT 18 10/24/2024    AST 11 10/24/2024    ALKPHOS 128 (H) 10/24/2024    BILITOT 0.4 10/24/2024     FLP  Lab Results   Component Value Date    TRIG 459 (H) 10/24/2024    CHOL 211 (H) 10/24/2024    LDLF 95 05/16/2023    LDLCALC  10/24/2024      Comment:      The calculation of LDL and VLDL are inaccurate when the Triglycerides are greater than 400 mg/dL or when the patient is non-fasting. If LDL measurement is necessary contact the testing laboratory for an alternative LDL assay.                                  Near   Borderline      AGE      Desirable  Optimal    High     High     Very High     0-19 Y     0 - 109     ---    110-129   >/= 130     ----    20-24 Y     0 - 119     ---    120-159   >/= 160     ----      >24 Y     0 -  99   100-129  130-159   160-189     >/=190      HDL 36.8 10/24/2024     Urine Microalbumin  No results found for: \"MICROALBCREA\"  Weight Management  Wt Readings from Last 3 Encounters:   10/24/24 109 kg (239 lb 6.4 oz)   08/16/23 112 kg (248 lb)   05/16/23 112 kg (246 lb 12.8 oz)      There is no height or weight on file to calculate BMI.     Assessment/Plan   Problem List Items " Addressed This Visit       Type 1 diabetes mellitus with diabetic polyneuropathy    Relevant Orders    Referral to Clinical Pharmacy       DISCUSSION/PLAN:  Patient with diabetes that needs improvement, most recent A1c of 9.5% (goal < 7%) in June 2025. He endorses occasional missed doses of insulin but denies symptoms of hypoglycemia at this time. Increased A1c multifactorial: missed doses of insulin, reduced insulin glargine dose, stopped metformin, and diet choices have not been ideal. Most recent fasting blood glucose levels around 250 mg/dL - insulin doses adjusted at endocrinology visit last week. Also discussed diet changes and cutting back on carbohydrates.    He is considering an insulin pump although he still has concerns with CGM use. His blood pressure has also been very elevated - recommended stopping Claritin-D and switching to an antihistamine without pseudoephedrine. No changes to diabetes regimen as he is established with endocrinology but will continue to follow every 3-4 weeks as patient is at high risk for nonadherence.     No medication changes - defer to endocrinology for diabetes management    Monitoring and Education:  Counseled patient on mechanism of action, dosing, drug interactions, side effects, and monitoring of diabetes medications  Reviewed symptoms and treatment of hypoglycemia, including Baqsimi  Answered all patient questions    Continue all meds under the continuation of care with the referring provider and clinical pharmacy team.    Clinical Pharmacist follow-up: July 10th, 2025 at 10:20 AM  Orders placed: none at this time    Thank you,   Nohelia Quezada, PharmD  Clinical Pharmacy Specialist, Primary Care   [phone]    Verbal consent to manage patient's drug therapy was obtained from the patient . Patient was informed he  may decline to participate or withdraw from participation in pharmacy services at any time.

## 2025-07-10 ENCOUNTER — APPOINTMENT (OUTPATIENT)
Dept: PHARMACY | Facility: HOSPITAL | Age: 55
End: 2025-07-10
Payer: MEDICARE

## 2025-07-22 ENCOUNTER — APPOINTMENT (OUTPATIENT)
Dept: PHARMACY | Facility: HOSPITAL | Age: 55
End: 2025-07-22
Payer: MEDICARE

## 2025-07-22 DIAGNOSIS — E10.42 TYPE 1 DIABETES MELLITUS WITH DIABETIC POLYNEUROPATHY: Primary | ICD-10-CM

## 2025-07-22 NOTE — PROGRESS NOTES
Clinical Pharmacy Appointment    Patient ID: Raul Cash is a 55 y.o. adult who presents for Diabetes.    Pt is here for Follow Up appointment.     Referring Provider: Jennifer Menezes DO  PCP: Jennifer Menezes DO   Last visit with PCP: 11/12/2024   Next visit with PCP: not yet scheduled    INTERVAL HISTORY   Patient's last appointment with clinical pharmacy team on 6/17/2025  Recent hospitalizations? No   Medication changes? No  Missed doses of medications? Yes  Occasional missed doses of insulin  Side effects? No  Updated relevant labs? No  Changes to diet or exercise regimen? No      Subjective     HPI  DIABETES:    Diagnosed with diabetes: approximately 8-9 years ago  Due to unclear diagnosis (type 1 vs. type 2), patient recently underwent PANDA antibody testing which was positive, suspect DL  Known diabetic complications: none known  Does patient follow with Endocrinology: yes  Last visit: 6/5/2025  Next visit: 10/7/2025  Last eye exam: follows regularly     Current diabetic medications include:  Basaglar 70 units once daily at night  Novolog 21 units three times daily with meals and 5-10 units with evening snack    Historical diabetic medications include:  metformin - stopped by endocrinology    Glucose Readings:  Patient is checking blood glucose 3-4 times per day with glucometer  He previously used Dexcom G7 but has concerns with data sharing and skin reactions  No recent blood glucose readings available  He is aware of hypoglycemia symptoms and has PRN glucagon available    Lifestyle:  Diet: 3 meals/day plus evening snack  Tries to limit sugar, does not go out to eat  Breakfast: almond milk, breakfast sandwich, banana  Lunch: usually salad and bologna sandwich  Dinner: variable, pork chop or skinless chicken breast, rice  Evening snacks: chips, cereal  Physical Activity: walks to stores/pharmacy    Secondary Prevention:  Statin? No  ACE-I/ARB? No  Aspirin? No    Pertinent PMH Review:  PMH of  "Pancreatitis: Yes  PMH of Retinopathy: No  PMH of Urinary Tract Infections: No      Medication System Management  Patient's preferred pharmacy: [facility] in [place], [place] (walking distance for patient)  Adherence/Organization: patient denies missed doses of insulins  Affordability/Accessibility: cost concerns      Objective   Allergies   Allergen Reactions    Lithium Hallucinations     Pt gets \"crazy\"    Penicillins Rash     Social History     Social History Narrative    Not on file      Medication Review  Current Outpatient Medications   Medication Instructions    ALPRAZolam (XANAX) 1 mg    amphetamine-dextroamphetamine (Adderall) 15 mg tablet 15 mg, oral, Every evening    amphetamine-dextroamphetamine (Adderall) 30 mg tablet Take 1 tablet (30 mg) by mouth once daily.    Basaglar KwikPen U-100 Insulin 100 Units, subcutaneous, Daily    Dexcom G7  misc Use as instructed    Dexcom G7 Sensor device Apply one sensor under the skin as directed every 10 days.    divalproex (Depakote ER) 250 mg 24 hr tablet 1 tablet, oral, 2 times daily    esomeprazole magnesium (NEXIUM ORAL) Take by mouth.    glucagon (Baqsimi) 3 mg/actuation spray,non-aerosol 1 spray, nasal, As needed    insulin aspart (NovoLOG) 100 unit/mL (3 mL) pen Inject 20 units three times daily with meals    metoprolol tartrate (LOPRESSOR) 100 mg, oral, 2 times daily    OneTouch Ultra Test strip Use as directed 4 times daily to check blood glucose    pen needle, diabetic 32 gauge x 5/32\" needle Use with insulin 4 times daily as directed    risperiDONE (RisperDAL) 2 mg tablet     rosuvastatin (CRESTOR) 20 mg, oral, Daily    sildenafil (VIAGRA) 100 mg, oral, Daily PRN    zolpidem (Ambien) 10 mg tablet 1 tablet, Nightly PRN      Vitals  BP Readings from Last 2 Encounters:   10/24/24 140/90   08/16/23 (!) 160/100     BMI Readings from Last 1 Encounters:   10/24/24 35.61 kg/m²      Labs  A1C  Lab Results   Component Value Date    HGBA1C 11.7 (H) 10/24/2024    " "HGBA1C 12.9 (H) 02/11/2024    HGBA1C 11.6 (A) 08/16/2023     BMP  Lab Results   Component Value Date    CALCIUM 9.5 10/24/2024     (L) 10/24/2024    K 4.8 10/24/2024    CO2 20 (L) 10/24/2024    CL 99 10/24/2024    BUN 25 (H) 10/24/2024    CREATININE 1.23 10/24/2024    EGFR 53 (L) 10/24/2024     LFTs  Lab Results   Component Value Date    ALT 18 10/24/2024    AST 11 10/24/2024    ALKPHOS 128 (H) 10/24/2024    BILITOT 0.4 10/24/2024     FLP  Lab Results   Component Value Date    TRIG 459 (H) 10/24/2024    CHOL 211 (H) 10/24/2024    LDLF 95 05/16/2023    LDLCALC  10/24/2024      Comment:      The calculation of LDL and VLDL are inaccurate when the Triglycerides are greater than 400 mg/dL or when the patient is non-fasting. If LDL measurement is necessary contact the testing laboratory for an alternative LDL assay.                                  Near   Borderline      AGE      Desirable  Optimal    High     High     Very High     0-19 Y     0 - 109     ---    110-129   >/= 130     ----    20-24 Y     0 - 119     ---    120-159   >/= 160     ----      >24 Y     0 -  99   100-129  130-159   160-189     >/=190      HDL 36.8 10/24/2024     Urine Microalbumin  No results found for: \"MICROALBCREA\"  Weight Management  Wt Readings from Last 3 Encounters:   10/24/24 109 kg (239 lb 6.4 oz)   08/16/23 112 kg (248 lb)   05/16/23 112 kg (246 lb 12.8 oz)      There is no height or weight on file to calculate BMI.     Assessment/Plan   Problem List Items Addressed This Visit       Type 1 diabetes mellitus with diabetic polyneuropathy - Primary    Relevant Orders    Referral to Clinical Pharmacy       DISCUSSION/PLAN:  Patient with diabetes that needs improvement, most recent A1c of 9.5% (goal < 7%) in June 2025. He endorses occasional missed doses of insulin but denies symptoms of hypoglycemia at this time. We again discussed the benefits of insulin pump - patient to discuss further at next endocrinology visit. No changes to " diabetes regimen as he is established with endocrinology but will continue to follow monthly as patient is at high risk for nonadherence.     No medication changes - defer to endocrinology for diabetes management    Monitoring and Education:  Counseled patient on mechanism of action, dosing, drug interactions, side effects, and monitoring of diabetes medications  Reviewed symptoms and treatment of hypoglycemia, including Baqsimi  Answered all patient questions    Continue all meds under the continuation of care with the referring provider and clinical pharmacy team.    Clinical Pharmacist follow-up: August 19th, 2025 at 12:20 PM  Orders placed: none at this time    Thank you,   Nohelia Quezada, PharmD  Clinical Pharmacy Specialist, Primary Care   [phone]    Verbal consent to manage patient's drug therapy was obtained from the patient . Patient was informed he  may decline to participate or withdraw from participation in pharmacy services at any time.

## 2025-08-19 ENCOUNTER — APPOINTMENT (OUTPATIENT)
Dept: PHARMACY | Facility: HOSPITAL | Age: 55
End: 2025-08-19
Payer: MEDICARE

## 2025-08-19 DIAGNOSIS — E10.42 TYPE 1 DIABETES MELLITUS WITH DIABETIC POLYNEUROPATHY: ICD-10-CM

## 2025-09-16 ENCOUNTER — APPOINTMENT (OUTPATIENT)
Dept: PHARMACY | Facility: HOSPITAL | Age: 55
End: 2025-09-16
Payer: MEDICARE